# Patient Record
Sex: MALE | Race: WHITE | NOT HISPANIC OR LATINO | Employment: OTHER | URBAN - METROPOLITAN AREA
[De-identification: names, ages, dates, MRNs, and addresses within clinical notes are randomized per-mention and may not be internally consistent; named-entity substitution may affect disease eponyms.]

---

## 2019-08-11 ENCOUNTER — APPOINTMENT (OUTPATIENT)
Dept: RADIOLOGY | Facility: MEDICAL CENTER | Age: 74
DRG: 065 | End: 2019-08-11
Attending: EMERGENCY MEDICINE
Payer: MEDICARE

## 2019-08-11 ENCOUNTER — HOSPITAL ENCOUNTER (INPATIENT)
Facility: MEDICAL CENTER | Age: 74
LOS: 3 days | DRG: 065 | End: 2019-08-15
Attending: EMERGENCY MEDICINE | Admitting: INTERNAL MEDICINE
Payer: MEDICARE

## 2019-08-11 ENCOUNTER — HOSPITAL ENCOUNTER (OUTPATIENT)
Dept: RADIOLOGY | Facility: MEDICAL CENTER | Age: 74
End: 2019-08-11

## 2019-08-11 ENCOUNTER — APPOINTMENT (OUTPATIENT)
Dept: RADIOLOGY | Facility: MEDICAL CENTER | Age: 74
DRG: 065 | End: 2019-08-11
Attending: INTERNAL MEDICINE
Payer: MEDICARE

## 2019-08-11 DIAGNOSIS — R42 DIZZINESS: Primary | ICD-10-CM

## 2019-08-11 PROBLEM — I25.10 CAD (CORONARY ARTERY DISEASE): Status: ACTIVE | Noted: 2019-08-11

## 2019-08-11 PROBLEM — Z79.4 TYPE 2 DIABETES MELLITUS, WITH LONG-TERM CURRENT USE OF INSULIN (HCC): Status: ACTIVE | Noted: 2019-08-11

## 2019-08-11 PROBLEM — R27.0 ATAXIA: Status: ACTIVE | Noted: 2019-08-11

## 2019-08-11 PROBLEM — I10 HYPERTENSION: Status: ACTIVE | Noted: 2019-08-11

## 2019-08-11 PROBLEM — R79.89 ELEVATED TROPONIN: Status: ACTIVE | Noted: 2019-08-11

## 2019-08-11 PROBLEM — E11.9 TYPE 2 DIABETES MELLITUS, WITH LONG-TERM CURRENT USE OF INSULIN (HCC): Status: ACTIVE | Noted: 2019-08-11

## 2019-08-11 LAB
ABO GROUP BLD: NORMAL
ALBUMIN SERPL BCP-MCNC: 4.3 G/DL (ref 3.2–4.9)
ALBUMIN/GLOB SERPL: 1.5 G/DL
ALP SERPL-CCNC: 87 U/L (ref 30–99)
ALT SERPL-CCNC: 15 U/L (ref 2–50)
ANION GAP SERPL CALC-SCNC: 13 MMOL/L (ref 0–11.9)
APPEARANCE UR: CLEAR
APTT PPP: 26.2 SEC (ref 24.7–36)
AST SERPL-CCNC: 19 U/L (ref 12–45)
BASOPHILS # BLD AUTO: 0.2 % (ref 0–1.8)
BASOPHILS # BLD: 0.02 K/UL (ref 0–0.12)
BILIRUB SERPL-MCNC: 0.5 MG/DL (ref 0.1–1.5)
BILIRUB UR QL STRIP.AUTO: NEGATIVE
BLD GP AB SCN SERPL QL: NORMAL
BUN SERPL-MCNC: 12 MG/DL (ref 8–22)
CALCIUM SERPL-MCNC: 9.5 MG/DL (ref 8.5–10.5)
CHLORIDE SERPL-SCNC: 106 MMOL/L (ref 96–112)
CO2 SERPL-SCNC: 22 MMOL/L (ref 20–33)
COLOR UR: YELLOW
CREAT SERPL-MCNC: 0.96 MG/DL (ref 0.5–1.4)
EKG IMPRESSION: NORMAL
EOSINOPHIL # BLD AUTO: 0.01 K/UL (ref 0–0.51)
EOSINOPHIL NFR BLD: 0.1 % (ref 0–6.9)
ERYTHROCYTE [DISTWIDTH] IN BLOOD BY AUTOMATED COUNT: 47.1 FL (ref 35.9–50)
GLOBULIN SER CALC-MCNC: 2.9 G/DL (ref 1.9–3.5)
GLUCOSE BLD-MCNC: 117 MG/DL (ref 65–99)
GLUCOSE BLD-MCNC: 124 MG/DL (ref 65–99)
GLUCOSE BLD-MCNC: 151 MG/DL (ref 65–99)
GLUCOSE SERPL-MCNC: 158 MG/DL (ref 65–99)
GLUCOSE UR STRIP.AUTO-MCNC: NEGATIVE MG/DL
HCT VFR BLD AUTO: 39.9 % (ref 42–52)
HGB BLD-MCNC: 12.9 G/DL (ref 14–18)
IMM GRANULOCYTES # BLD AUTO: 0.04 K/UL (ref 0–0.11)
IMM GRANULOCYTES NFR BLD AUTO: 0.5 % (ref 0–0.9)
INR PPP: 1.02 (ref 0.87–1.13)
KETONES UR STRIP.AUTO-MCNC: >=80 MG/DL
LEUKOCYTE ESTERASE UR QL STRIP.AUTO: NEGATIVE
LYMPHOCYTES # BLD AUTO: 1.3 K/UL (ref 1–4.8)
LYMPHOCYTES NFR BLD: 16 % (ref 22–41)
MCH RBC QN AUTO: 27.4 PG (ref 27–33)
MCHC RBC AUTO-ENTMCNC: 32.3 G/DL (ref 33.7–35.3)
MCV RBC AUTO: 84.7 FL (ref 81.4–97.8)
MICRO URNS: ABNORMAL
MONOCYTES # BLD AUTO: 0.48 K/UL (ref 0–0.85)
MONOCYTES NFR BLD AUTO: 5.9 % (ref 0–13.4)
NEUTROPHILS # BLD AUTO: 6.27 K/UL (ref 1.82–7.42)
NEUTROPHILS NFR BLD: 77.3 % (ref 44–72)
NITRITE UR QL STRIP.AUTO: NEGATIVE
NRBC # BLD AUTO: 0 K/UL
NRBC BLD-RTO: 0 /100 WBC
PH UR STRIP.AUTO: 6 [PH] (ref 5–8)
PLATELET # BLD AUTO: 203 K/UL (ref 164–446)
PMV BLD AUTO: 8.7 FL (ref 9–12.9)
POTASSIUM SERPL-SCNC: 4.3 MMOL/L (ref 3.6–5.5)
PROT SERPL-MCNC: 7.2 G/DL (ref 6–8.2)
PROT UR QL STRIP: NEGATIVE MG/DL
PROTHROMBIN TIME: 13.6 SEC (ref 12–14.6)
RBC # BLD AUTO: 4.71 M/UL (ref 4.7–6.1)
RBC UR QL AUTO: NEGATIVE
RH BLD: NORMAL
SODIUM SERPL-SCNC: 141 MMOL/L (ref 135–145)
SP GR UR STRIP.AUTO: 1.01
TROPONIN T SERPL-MCNC: 42 NG/L (ref 6–19)
TROPONIN T SERPL-MCNC: 44 NG/L (ref 6–19)
TSH SERPL DL<=0.005 MIU/L-ACNC: 1.5 UIU/ML (ref 0.38–5.33)
UROBILINOGEN UR STRIP.AUTO-MCNC: 0.2 MG/DL
WBC # BLD AUTO: 8.1 K/UL (ref 4.8–10.8)

## 2019-08-11 PROCEDURE — A9270 NON-COVERED ITEM OR SERVICE: HCPCS | Performed by: EMERGENCY MEDICINE

## 2019-08-11 PROCEDURE — 700102 HCHG RX REV CODE 250 W/ 637 OVERRIDE(OP): Performed by: INTERNAL MEDICINE

## 2019-08-11 PROCEDURE — 700111 HCHG RX REV CODE 636 W/ 250 OVERRIDE (IP): Performed by: INTERNAL MEDICINE

## 2019-08-11 PROCEDURE — G0378 HOSPITAL OBSERVATION PER HR: HCPCS

## 2019-08-11 PROCEDURE — 71045 X-RAY EXAM CHEST 1 VIEW: CPT

## 2019-08-11 PROCEDURE — A9270 NON-COVERED ITEM OR SERVICE: HCPCS | Performed by: INTERNAL MEDICINE

## 2019-08-11 PROCEDURE — 99285 EMERGENCY DEPT VISIT HI MDM: CPT

## 2019-08-11 PROCEDURE — 84484 ASSAY OF TROPONIN QUANT: CPT

## 2019-08-11 PROCEDURE — 96372 THER/PROPH/DIAG INJ SC/IM: CPT

## 2019-08-11 PROCEDURE — 700105 HCHG RX REV CODE 258: Performed by: INTERNAL MEDICINE

## 2019-08-11 PROCEDURE — 36415 COLL VENOUS BLD VENIPUNCTURE: CPT

## 2019-08-11 PROCEDURE — 70498 CT ANGIOGRAPHY NECK: CPT

## 2019-08-11 PROCEDURE — 82962 GLUCOSE BLOOD TEST: CPT | Mod: 91

## 2019-08-11 PROCEDURE — 93005 ELECTROCARDIOGRAM TRACING: CPT | Performed by: EMERGENCY MEDICINE

## 2019-08-11 PROCEDURE — 86901 BLOOD TYPING SEROLOGIC RH(D): CPT

## 2019-08-11 PROCEDURE — 99220 PR INITIAL OBSERVATION CARE,LEVL III: CPT | Mod: 25 | Performed by: INTERNAL MEDICINE

## 2019-08-11 PROCEDURE — 86900 BLOOD TYPING SEROLOGIC ABO: CPT

## 2019-08-11 PROCEDURE — 700102 HCHG RX REV CODE 250 W/ 637 OVERRIDE(OP): Performed by: EMERGENCY MEDICINE

## 2019-08-11 PROCEDURE — 85025 COMPLETE CBC W/AUTO DIFF WBC: CPT

## 2019-08-11 PROCEDURE — 93010 ELECTROCARDIOGRAM REPORT: CPT | Performed by: INTERNAL MEDICINE

## 2019-08-11 PROCEDURE — 93005 ELECTROCARDIOGRAM TRACING: CPT | Performed by: INTERNAL MEDICINE

## 2019-08-11 PROCEDURE — 700117 HCHG RX CONTRAST REV CODE 255: Performed by: EMERGENCY MEDICINE

## 2019-08-11 PROCEDURE — 85730 THROMBOPLASTIN TIME PARTIAL: CPT

## 2019-08-11 PROCEDURE — 85610 PROTHROMBIN TIME: CPT

## 2019-08-11 PROCEDURE — 70496 CT ANGIOGRAPHY HEAD: CPT

## 2019-08-11 PROCEDURE — 84443 ASSAY THYROID STIM HORMONE: CPT

## 2019-08-11 PROCEDURE — 99358 PROLONG SERVICE W/O CONTACT: CPT | Performed by: INTERNAL MEDICINE

## 2019-08-11 PROCEDURE — 81003 URINALYSIS AUTO W/O SCOPE: CPT

## 2019-08-11 PROCEDURE — 86850 RBC ANTIBODY SCREEN: CPT

## 2019-08-11 PROCEDURE — 80053 COMPREHEN METABOLIC PANEL: CPT

## 2019-08-11 PROCEDURE — 70551 MRI BRAIN STEM W/O DYE: CPT

## 2019-08-11 RX ORDER — TAMSULOSIN HYDROCHLORIDE 0.4 MG/1
0.4 CAPSULE ORAL
COMMUNITY
Start: 2016-01-01

## 2019-08-11 RX ORDER — ONDANSETRON 2 MG/ML
4 INJECTION INTRAMUSCULAR; INTRAVENOUS EVERY 4 HOURS PRN
Status: DISCONTINUED | OUTPATIENT
Start: 2019-08-11 | End: 2019-08-15 | Stop reason: HOSPADM

## 2019-08-11 RX ORDER — SODIUM CHLORIDE 9 MG/ML
INJECTION, SOLUTION INTRAVENOUS CONTINUOUS
Status: DISCONTINUED | OUTPATIENT
Start: 2019-08-11 | End: 2019-08-15

## 2019-08-11 RX ORDER — MECLIZINE HYDROCHLORIDE 25 MG/1
25 TABLET ORAL 3 TIMES DAILY PRN
Status: DISCONTINUED | OUTPATIENT
Start: 2019-08-11 | End: 2019-08-15 | Stop reason: HOSPADM

## 2019-08-11 RX ORDER — ASPIRIN 325 MG
325 TABLET ORAL DAILY
Status: DISCONTINUED | OUTPATIENT
Start: 2019-08-12 | End: 2019-08-14

## 2019-08-11 RX ORDER — BISACODYL 10 MG
10 SUPPOSITORY, RECTAL RECTAL
Status: DISCONTINUED | OUTPATIENT
Start: 2019-08-11 | End: 2019-08-15 | Stop reason: HOSPADM

## 2019-08-11 RX ORDER — ONDANSETRON 4 MG/1
4 TABLET, ORALLY DISINTEGRATING ORAL EVERY 4 HOURS PRN
Status: DISCONTINUED | OUTPATIENT
Start: 2019-08-11 | End: 2019-08-15 | Stop reason: HOSPADM

## 2019-08-11 RX ORDER — LORAZEPAM 2 MG/ML
0.5 INJECTION INTRAMUSCULAR ONCE
Status: COMPLETED | OUTPATIENT
Start: 2019-08-11 | End: 2019-08-11

## 2019-08-11 RX ORDER — ASPIRIN 81 MG/1
324 TABLET, CHEWABLE ORAL DAILY
Status: DISCONTINUED | OUTPATIENT
Start: 2019-08-12 | End: 2019-08-14

## 2019-08-11 RX ORDER — CARVEDILOL 12.5 MG/1
12.5 TABLET ORAL 2 TIMES DAILY WITH MEALS
Status: ON HOLD | COMMUNITY
Start: 2015-01-01 | End: 2019-08-15

## 2019-08-11 RX ORDER — ROSUVASTATIN CALCIUM 20 MG/1
40 TABLET, COATED ORAL DAILY
Status: ON HOLD | COMMUNITY
Start: 2016-01-01 | End: 2019-08-15

## 2019-08-11 RX ORDER — VENLAFAXINE 37.5 MG/1
37.5 TABLET ORAL 2 TIMES DAILY
COMMUNITY
Start: 2017-01-01

## 2019-08-11 RX ORDER — ASPIRIN 81 MG/1
81 TABLET, CHEWABLE ORAL DAILY
COMMUNITY

## 2019-08-11 RX ORDER — AMOXICILLIN 250 MG
2 CAPSULE ORAL 2 TIMES DAILY
Status: DISCONTINUED | OUTPATIENT
Start: 2019-08-11 | End: 2019-08-15 | Stop reason: HOSPADM

## 2019-08-11 RX ORDER — POLYETHYLENE GLYCOL 3350 17 G/17G
1 POWDER, FOR SOLUTION ORAL
Status: DISCONTINUED | OUTPATIENT
Start: 2019-08-11 | End: 2019-08-15 | Stop reason: HOSPADM

## 2019-08-11 RX ORDER — ASPIRIN 300 MG/1
300 SUPPOSITORY RECTAL DAILY
Status: DISCONTINUED | OUTPATIENT
Start: 2019-08-12 | End: 2019-08-14

## 2019-08-11 RX ORDER — INSULIN GLARGINE 100 [IU]/ML
28 INJECTION, SOLUTION SUBCUTANEOUS NIGHTLY
Status: ON HOLD | COMMUNITY
End: 2019-08-15

## 2019-08-11 RX ORDER — ATORVASTATIN CALCIUM 40 MG/1
40 TABLET, FILM COATED ORAL EVERY EVENING
Status: DISCONTINUED | OUTPATIENT
Start: 2019-08-11 | End: 2019-08-12

## 2019-08-11 RX ORDER — ASPIRIN 325 MG
325 TABLET ORAL ONCE
Status: COMPLETED | OUTPATIENT
Start: 2019-08-11 | End: 2019-08-11

## 2019-08-11 RX ADMIN — SODIUM CHLORIDE: 9 INJECTION, SOLUTION INTRAVENOUS at 05:02

## 2019-08-11 RX ADMIN — SODIUM CHLORIDE: 9 INJECTION, SOLUTION INTRAVENOUS at 04:15

## 2019-08-11 RX ADMIN — ENOXAPARIN SODIUM 40 MG: 100 INJECTION SUBCUTANEOUS at 05:01

## 2019-08-11 RX ADMIN — SODIUM CHLORIDE: 9 INJECTION, SOLUTION INTRAVENOUS at 21:40

## 2019-08-11 RX ADMIN — INSULIN HUMAN 1 UNITS: 100 INJECTION, SOLUTION PARENTERAL at 18:23

## 2019-08-11 RX ADMIN — LORAZEPAM 0.5 MG: 2 INJECTION INTRAMUSCULAR; INTRAVENOUS at 13:11

## 2019-08-11 RX ADMIN — ATORVASTATIN CALCIUM 40 MG: 40 TABLET, FILM COATED ORAL at 18:15

## 2019-08-11 RX ADMIN — ASPIRIN 325 MG: 325 TABLET, FILM COATED ORAL at 03:04

## 2019-08-11 RX ADMIN — IOHEXOL 80 ML: 350 INJECTION, SOLUTION INTRAVENOUS at 02:13

## 2019-08-11 SDOH — HEALTH STABILITY: MENTAL HEALTH: HOW OFTEN DO YOU HAVE 6 OR MORE DRINKS ON ONE OCCASION?: NEVER

## 2019-08-11 SDOH — HEALTH STABILITY: MENTAL HEALTH: HOW OFTEN DO YOU HAVE A DRINK CONTAINING ALCOHOL?: NEVER

## 2019-08-11 SDOH — HEALTH STABILITY: MENTAL HEALTH: HOW MANY STANDARD DRINKS CONTAINING ALCOHOL DO YOU HAVE ON A TYPICAL DAY?: 1 OR 2

## 2019-08-11 ASSESSMENT — COGNITIVE AND FUNCTIONAL STATUS - GENERAL
DAILY ACTIVITIY SCORE: 24
SUGGESTED CMS G CODE MODIFIER DAILY ACTIVITY: CH
MOBILITY SCORE: 24
SUGGESTED CMS G CODE MODIFIER MOBILITY: CH

## 2019-08-11 ASSESSMENT — PATIENT HEALTH QUESTIONNAIRE - PHQ9
2. FEELING DOWN, DEPRESSED, IRRITABLE, OR HOPELESS: NOT AT ALL
SUM OF ALL RESPONSES TO PHQ9 QUESTIONS 1 AND 2: 0
1. LITTLE INTEREST OR PLEASURE IN DOING THINGS: NOT AT ALL
SUM OF ALL RESPONSES TO PHQ9 QUESTIONS 1 AND 2: 0
2. FEELING DOWN, DEPRESSED, IRRITABLE, OR HOPELESS: NOT AT ALL
1. LITTLE INTEREST OR PLEASURE IN DOING THINGS: NOT AT ALL

## 2019-08-11 ASSESSMENT — ENCOUNTER SYMPTOMS
CHILLS: 0
FLANK PAIN: 0
BLOOD IN STOOL: 0
HEADACHES: 0
PALPITATIONS: 0
COUGH: 0
VOMITING: 1
SEIZURES: 0
SHORTNESS OF BREATH: 0
DIAPHORESIS: 0
WHEEZING: 0
SENSORY CHANGE: 0
BACK PAIN: 0
MYALGIAS: 0
VOMITING: 0
DIZZINESS: 1
FOCAL WEAKNESS: 0
DIARRHEA: 0
WEAKNESS: 0
SPUTUM PRODUCTION: 0
NAUSEA: 0
NAUSEA: 1
SPEECH CHANGE: 0
SORE THROAT: 0
NECK PAIN: 0
BLURRED VISION: 0
FEVER: 0
ABDOMINAL PAIN: 0
TINGLING: 0
BRUISES/BLEEDS EASILY: 0

## 2019-08-11 ASSESSMENT — LIFESTYLE VARIABLES
HAVE YOU EVER FELT YOU SHOULD CUT DOWN ON YOUR DRINKING: NO
TOTAL SCORE: 0
EVER HAD A DRINK FIRST THING IN THE MORNING TO STEADY YOUR NERVES TO GET RID OF A HANGOVER: NO
EVER_SMOKED: NEVER
DO YOU DRINK ALCOHOL: NO
AVERAGE NUMBER OF DAYS PER WEEK YOU HAVE A DRINK CONTAINING ALCOHOL: 0
TOTAL SCORE: 0
ON A TYPICAL DAY WHEN YOU DRINK ALCOHOL HOW MANY DRINKS DO YOU HAVE: 0
CONSUMPTION TOTAL: NEGATIVE
HAVE PEOPLE ANNOYED YOU BY CRITICIZING YOUR DRINKING: NO
ALCOHOL_USE: NO
EVER FELT BAD OR GUILTY ABOUT YOUR DRINKING: NO
TOTAL SCORE: 0
HOW MANY TIMES IN THE PAST YEAR HAVE YOU HAD 5 OR MORE DRINKS IN A DAY: 0

## 2019-08-11 NOTE — CARE PLAN
Problem: Safety  Goal: Will remain free from falls  Outcome: PROGRESSING AS EXPECTED  Intervention: Implement fall precautions  Flowsheets (Taken 8/11/2019 0603)  Environmental Precautions: Treaded Slipper Socks on Patient; Personal Belongings, Wastebasket, Call Bell etc. in Easy Reach; Bed in Low Position; Communication Sign for Patients & Families; Mobility Assessed & Appropriate Sign Placed  IV Pole on Same Side of Bed as Bathroom: Yes

## 2019-08-11 NOTE — H&P
Hospital Medicine History & Physical Note    Date of Service  8/11/2019    Primary Care Physician  No primary care provider on file.    Consultants  None    Code Status  Full code    Chief Complaint  Ataxia and vertigo    History of Presenting Illness  74 y.o. male with a past medical history of CAD status post stent placement and CABG, MVR, hypertension, insulin-dependent diabetes mellitus, hyperlipidemia who was transferred from [facility] on 8/11/2019 with concerns of posterior circulation stroke.  Patient reports vaping some marijuana Friday morning after which he suffered a ground-level fall and hit the back of his head.  He is unsure if he lost consciousness.  He denied any headache at the time.  Yesterday afternoon he suddenly developed symptoms of nausea and vertigo.  He was unable to ambulate and keep his balance.  He had multiple episodes of vomiting.  He denies any focal weakness, vision changes, headache, chest pain or shortness of breath.  He denies any fevers or chills.  He was taken to [place] [facility] where he underwent CT head without contrast that was negative.  He was transferred to [facility] for concerns of posterior circulation stroke.    I have reviewed the medical records from the transferring facility which are summarized as follows:    EKG from outlying facility interpreted by me reveals sinus rhythm with first-degree AV block.  No ST elevation or ST depression noted    X-ray: Prior heart surgery, valvular and coronary artery.  No acute cardiopulmonary process.  ASVD changes.  Degenerative changes in the shoulders and spine.  Probable hiatal hernia.    CT head without contrast: Moderate symmetric atrophic changes.  Probable old lacunar infarct on the right lentiform nucleus.  No acute hemorrhagic process, intra-or extra-axial.  Periventricular white matter changes.    WBC 11.6, hemoglobin 13.6, hematocrit 41, platelets 270  Sodium 142, potassium 3.9, chloride 106, CO2 21, anion gap 15, glucose  131, BUN 14, creatinine 1.12, calcium 9.2, total bili 0.4, alkaline phosphatase 100, AST 20, ALT 28, total protein 7.3, albumin 3.6, globulin 3.7  Troponin less than 0.04        Review of Systems  Review of Systems   Constitutional: Negative for chills, diaphoresis and fever.   HENT: Negative for hearing loss and sore throat.    Eyes: Negative for blurred vision.   Respiratory: Negative for cough, sputum production, shortness of breath and wheezing.    Cardiovascular: Negative for chest pain, palpitations and leg swelling.   Gastrointestinal: Positive for nausea and vomiting. Negative for abdominal pain, blood in stool and diarrhea.   Genitourinary: Negative for dysuria, flank pain and urgency.   Musculoskeletal: Negative for back pain, joint pain, myalgias and neck pain.   Skin: Negative for rash.   Neurological: Positive for dizziness. Negative for focal weakness, seizures and headaches.   Endo/Heme/Allergies: Does not bruise/bleed easily.   Psychiatric/Behavioral: Negative for suicidal ideas.   All other systems reviewed and are negative.      Past Medical History  CAD, hypertension, diabetes    Surgical History  CABG    Family History  No pertinent family hist    Social History   Denies alcohol or tobacco use    Allergies  NKDA    Medications  Lantus, Coreg, aspirin, metformin, rosuvastatin, Flomax       Physical Exam  Temp:  [36.6 °C (97.8 °F)-36.7 °C (98.1 °F)] 36.7 °C (98 °F)  Pulse:  [70-72] 72  Resp:  [18-20] 18  BP: (122-133)/(64-70) 133/67  SpO2:  [95 %-98 %] 98 %    Physical Exam   Constitutional: He is oriented to person, place, and time. He appears well-developed and well-nourished. No distress.   HENT:   Head: Normocephalic and atraumatic.   Mouth/Throat: Oropharynx is clear and moist.   Eyes: Pupils are equal, round, and reactive to light. Conjunctivae are normal. No scleral icterus.   Neck: Normal range of motion. Neck supple.   Cardiovascular: Normal rate, regular rhythm and normal heart sounds.    Pulmonary/Chest: Effort normal and breath sounds normal. No respiratory distress. He has no wheezes. He has no rales.   Abdominal: Soft. Bowel sounds are normal. He exhibits no distension. There is no tenderness. There is no rebound.   Musculoskeletal: Normal range of motion. He exhibits no edema or tenderness.   Lymphadenopathy:     He has no cervical adenopathy.   Neurological: He is alert and oriented to person, place, and time. No cranial nerve deficit. Coordination normal.   Strength and sensation intact bilaterally   Skin: Skin is warm. No rash noted.   Psychiatric: He has a normal mood and affect. His behavior is normal.   Nursing note and vitals reviewed.      Laboratory:  Recent Labs     08/11/19  0146   WBC 8.1   RBC 4.71   HEMOGLOBIN 12.9*   HEMATOCRIT 39.9*   MCV 84.7   MCH 27.4   MCHC 32.3*   RDW 47.1   PLATELETCT 203   MPV 8.7*     Recent Labs     08/11/19  0146   SODIUM 141   POTASSIUM 4.3   CHLORIDE 106   CO2 22   GLUCOSE 158*   BUN 12   CREATININE 0.96   CALCIUM 9.5     Recent Labs     08/11/19  0146   ALTSGPT 15   ASTSGOT 19   ALKPHOSPHAT 87   TBILIRUBIN 0.5   GLUCOSE 158*     Recent Labs     08/11/19  0146   APTT 26.2   INR 1.02     No results for input(s): NTPROBNP in the last 72 hours.      Recent Labs     08/11/19  0146   TROPONINT 42*       Urinalysis:    Recent Labs     08/11/19  0306   SPECGRAVITY 1.010   GLUCOSEUR Negative   KETONES >=80*   NITRITE Negative   LEUKESTERAS Negative        Imaging:  DX-CHEST-PORTABLE (1 VIEW)   Final Result         Minimal left basilar atelectasis.      CT-CTA HEAD WITH & W/O-POST PROCESS   Final Result         1. No hemodynamically significant narrowing of the major intracranial vessels.      2. Patent right vertebral artery. Somewhat diminutive appearance of the V4 portion of the left vertebral artery; however, there is still flow seen. If there is concern for acute ischemia, further evaluation with MRI is recommended.      CT-CTA NECK WITH & W/O-POST  PROCESSING   Final Result         1. Patent right vertebral artery. Somewhat diminutive appearance of the V4 portion of the left vertebral artery; however, there is still flow seen.      2. No evidence of flow-limiting stenosis in the cervical carotid arteries.      OUTSIDE IMAGES-DX CHEST   Final Result      OUTSIDE IMAGES-CT HEAD   Final Result      EC-ECHOCARDIOGRAM COMPLETE W/O CONT    (Results Pending)   MR-BRAIN-W/O    (Results Pending)         Assessment/Plan:  I anticipate this patient is appropriate for observation status at this time.    Ataxia- (present on admission)  Assessment & Plan  Sudden onset of ataxia concerning for posterior circulation stroke  Patient is past the tPA window  Patient will be placed on continuous cardiac monitoring to evaluate for any arrhythmias  Q4 hours neuro checks  I will order MRI brain  Check 2-D echo  Patient will be started on full dose aspirin and high intensity statin  Allow permissive hypertension  Check TSH, lipid panel and hemoglobin A1c  PTOT and ST evaluation        CAD (coronary artery disease)  Assessment & Plan  Continue aspirin and statin    Hypertension- (present on admission)  Assessment & Plan  Allow permissive hypertension  IV labetalol and hydralazine PRN for SBP greater than 220 and DBP greater than 120    Type 2 diabetes mellitus, with long-term current use of insulin (HCC)- (present on admission)  Assessment & Plan  Uncontrolled with hyperglycemia  Start on insulin sliding scale with serial Accu-Checks  Check hemoglobin A1c  Hypoglycemic protocol in place        Elevated troponin- (present on admission)  Assessment & Plan  Denies active chest pain  Trend troponin with continuous cardiac monitoring    Vertigo- (present on admission)  Assessment & Plan  MRI brain ordered  Meclizine as needed  PT OT with vestibular rehab        VTE prophylaxis: lovenox    I spent a total of 30 minutes of non face to face time performing additional research, reviewing  medical records from transferring facility, discussing plan of care with other healthcare providers. Start time: 3 50 am. End time: 4 20 am

## 2019-08-11 NOTE — ED NOTES
Patient resting in bed, sleeping, no signs of pain or distress, unlabored breathing noted, attached to cardiac monitor, bed in low position, call light within reach. Waiting for admission/bed assignment. VSS.

## 2019-08-11 NOTE — ASSESSMENT & PLAN NOTE
S/p permissive hypertension  - resumed coreg, lower dose as he has non sustaining and asymptomatic bradycardia

## 2019-08-11 NOTE — ED NOTES
Chief Complaint   Patient presents with   • Possible Stroke     Stroke transfer from [facility].  Pt reports hitting his head at 9 am this morning on a steel bar off of his camper.  Not certain if had LOC.  He then developed vomiting, dizziness, and ataxia at 1500.  BIB family to [facility] and admitted to using marijuana vape pen for the first time today as well.  A CT of head was negative, but symptoms did not improve and pt was transfered to [facility] via flight transfer.     Upon arrival, pt is Oriented and appropriatte.  Flight crew reports he did vomit over the flight.  NS=452 at 0035.  Seen by ERP and sent to CT scan on monitor.

## 2019-08-11 NOTE — ED NOTES
Urinal placed at bedside for pt use. Pt continues resting, VSS. Waiting for results & disposition.

## 2019-08-11 NOTE — ASSESSMENT & PLAN NOTE
With hyperglycemia but A1C controlled at 6.3%. Takes metformin, januvia and lantus at home.  - sensitive sliding scale  - DM diet and hypoglycemia protocol

## 2019-08-11 NOTE — ED NOTES
Xray at bedside. Pt brought from CT to ED room red 7. Report received from Renay ENNIS, assuming care of pt at this time. Pt in NAD, VSS. Placed on continuous cardiac monitoring, 2 L NC for comfort, continuous pulse ox/BP.

## 2019-08-11 NOTE — ED PROVIDER NOTES
"ED Provider Note     8/11/2019  2:07 AM    Means of Arrival: EMS transfer via air transport  History obtained by: patient and outside facility  Limitations:     CHIEF COMPLAINT  Transfer for possible posterior stroke    HPI  Aleks Hernandez is a 74 y.o. male history of diabetes, hypertension who presents as transfer for concerns possible posterior stroke.  Symptoms started approximately 3 PM this afternoon.  He says yesterday shortly after taking hit of marijuana vape he fell backwards and hit his head on a tree stump.  He believes he may have lost consciousness for \"a second.\"  He had no headache afterwards.  No neck pain afterwards.  No dizziness afterwards.  He again tried marijuana vape this afternoon.  Shortly after his when he developed symptoms of persistent vertigo.  He says he cannot keep his balance.  Says he has thrown up multiple times.  At outside hospital he had a noncontrast CT scan which was unremarkable.  They have no MRI capabilities and was sent here for further evaluation. IR stroke evaluation initiated here.  Denies any focal weakness.  No vision changes.  No facial droop.  No difficulty speaking.  He says dizziness has improved since he arrived.    REVIEW OF SYSTEMS  Review of Systems   Constitutional: Negative for chills and fever.   HENT: Negative for congestion.    Respiratory: Negative for cough and shortness of breath.    Cardiovascular: Negative for chest pain.   Gastrointestinal: Negative for abdominal pain, nausea and vomiting.   Musculoskeletal: Negative for back pain and neck pain.   Skin: Negative for rash.   Neurological: Positive for dizziness. Negative for tingling, sensory change, speech change, focal weakness, weakness and headaches.   All other systems reviewed and are negative.    See HPI for further details.    PAST MEDICAL HISTORY       SOCIAL HISTORY  Social History     Tobacco Use   • Smoking status: Not on file   Substance and Sexual Activity   • Alcohol use: Not on file   • " "Drug use: Not on file   • Sexual activity: Not on file       SURGICAL HISTORY  patient denies any surgical history    CURRENT MEDICATIONS  Home Medications    **Home medications have not yet been reviewed for this encounter**         ALLERGIES  Allergies not on file    PHYSICAL EXAM  VITAL SIGNS: /77   Pulse 66   Temp 36.8 °C (98.2 °F) (Temporal)   Resp 18   Ht 1.75 m (5' 8.9\")   Wt 97.8 kg (215 lb 9.8 oz)   SpO2 100%   BMI 31.93 kg/m²     Pulse ox interpretation: I interpret this pulse ox as normal.  Constitutional: Alert in no apparent distress 74 year old man on stretcher with vomit on gown.  HENT: No signs of trauma, Bilateral external ears normal, Nose normal.   Eyes: Pupils are equal, Conjunctiva normal, Non-icteric. No nystagmus.  Neck: Normal range of motion, No tenderness, Supple, No stridor.   Cardiovascular: Regular rate and rhythm, no murmurs. Symmetric distal pulses. No cyanosis of extremities. No peripheral edema of extremities.  Thorax & Lungs: Normal breath sounds, No respiratory distress, No wheezing, No chest tenderness.   Abdomen: Bowel sounds normal, Soft, No tenderness, No masses, No pulsatile masses. No peritoneal signs.  Skin: Warm, Dry, No erythema, No rash.   Back: No midline bony tenderness, No CVA tenderness.   Musculoskeletal: Good range of motion in all major joints. No tenderness to palpation or major deformities noted.   Neurologic: Alert and oriented to person place time situation.  He has no facial droop.  He has clear speech.  No visual deficits.  Out of 5 strength in all extremities.  He has no limb ataxia.  No sensory deficits.  No aphasia.  NIH stroke 0.   Psychiatric: Affect normal, Judgment normal, Mood normal.   Physical Exam      DIAGNOSTIC STUDIES / PROCEDURES    EKG  Results for orders placed or performed during the hospital encounter of 08/11/19   EKG (NOW)   Result Value Ref Range    Report       [facility]    Test Date:  " 2019  Pt Name:    SANTIAGO BENNETT                  Department: ER  MRN:        [id number]                      Room:  Gender:     Male                         Technician: [id number]  :        1945                   Requested By:ESDRAS CONTRERAS II  Order #:    [id number]                    Reading MD: Esdras Contreras II, MD    Measurements  Intervals                                Axis  Rate:       67                           P:          0  AZ:         276                          QRS:        -11  QRSD:       100                          T:          4  QT:         456  QTc:        482    Interpretive Statements  SINUS RHYTHM  Rate 67  FIRST DEGREE AV BLOCK, BORDERLINE PROLONGED QT INTERVAL  NO ST elevation or depression.  T wave flattening in inferior lateral leads  First degree AV block, sinus rhythm with nonspecific EKG findings.  No previous ECG available for comparison    Electronically Signed On 2019 3:05:42  PDT by Esdras Contrersa II, MD     EKG   Result Value Ref Range    Report       [facility]    Test Date:  2019  Pt Name:    SANTIAGO BENNETT                  Department: ER  MRN:        [id number]                      Room:       [facility]  Gender:     Male                         Technician: [id number]  :        1945                   Requested By:ESDRAS CONTRERAS II  Order #:    [id number]                    Reading MD:    Measurements  Intervals                                Axis  Rate:       73                           P:          0  AZ:         203                          QRS:        -29  QRSD:       98                           T:          -20  QT:         452  QTc:        499    Interpretive Statements  SINUS RHYTHM  SINUS PAUSE/ARREST WITH ATRIAL ESCAPE  BORDERLINE LEFT AXIS DEVIATION  BORDERLINE LOW VOLTAGE IN FRONTAL LEADS  LATE PRECORDIAL R/S TRANSITION  BORDERLINE T ABNORMALITIES, ANT-LAT LEADS  BORDERLINE PROLONGED QT INTERVAL  BASELINE WANDER IN LEAD(S) V1  Compared to ECG  2019 02:35:55  Sinus pause or arrest now present  ST (T wave) tomy ation no longer present  First degree AV block no longer present  T-wave abnormality still present     EKG in four (4) hours   Result Value Ref Range    Report       [facility]    Test Date:  2019  Pt Name:    SANTIAGO BENNETT                  Department: ER  MRN:        [id number]                      Room:       [facility]  Gender:     Male                         Technician: HARSHA  :        1945                   Requested By:OLIVIA DURBIN  Order #:    [id number]                    Reading MD:    Measurements  Intervals                                Axis  Rate:       73                           P:          0  NH:         203                          QRS:        -29  QRSD:       98                           T:          -20  QT:         452  QTc:        499    Interpretive Statements  SINUS RHYTHM  SINUS PAUSE/ARREST WITH ATRIAL ESCAPE  BORDERLINE LEFT AXIS DEVIATION  BORDERLINE LOW VOLTAGE IN FRONTAL LEADS  LATE PRECORDIAL R/S TRANSITION  BORDERLINE T ABNORMALITIES, ANT-LAT LEADS  BORDERLINE PROLONGED QT INTERVAL  Compared to ECG 2019 02:35:55  Sinus pause or arrest now present  ST (T wave) deviation no longer present  First degree AV  block no longer present  T-wave abnormality still present           LABS  Pertinent Labs & Imaging studies reviewed. (See chart for details)    RADIOLOGY  Pertinent Labs & Imaging studies reviewed. (See chart for details)    COURSE & MEDICAL DECISION MAKING  Pertinent Labs & Imaging studies reviewed. (See chart for details)    2:07 AM This is an emergent evaluation of a  74 y.o. male diabetes and hypertension who presents as transfer from [place] for concerns of possible posterior stroke.  He is clearly out of the window for TPA at this time.  Onset was over 10 hours ago.  On initial exam he has no obvious deficits.  He does not have nystagmus at this time.  I evaluated him initially at EMS door and  I have ordered CTA of the head and neck.  He has already had a CT noncontrast of the head that did not show any signs of hemorrhage, obvious stroke or masses.     3:08 AM  NIH score 0.  CTA did not show disruption and flow of carotid or vertebral arteries.  Labs with no major abnormalities except a elevated troponin at 42.  This is mild to moderately elevated.  He denies having any chest pain or shortness of breath.  His EKG is not consistent with a STEMI.  Spoke with Dr. Iglesias who has kindly agreed to admit for further elevation of vertigo, possible posterior stroke, possible MI.  I did give Mr. Hernandez a 325 mg aspirin here in the emergency department.     The patient will return for worsening symptoms and is stable at the time of discharge. The patient verbalizes understanding. Guidance was provided on appropriate use of medications including driving under the influence, overdose, and side effects.         FINAL IMPRESSION    ICD-10-CM   1. Dizziness R42            This dictation was created using voice recognition software. The accuracy of the dictation is limited to the abilities of the software. I expect there may be some errors of grammar and possibly content. The nursing notes were reviewed and certain aspects of this information were incorporated into this note.    Electronically signed by: Esdras Barcenas II, 8/11/2019 2:07 AM

## 2019-08-11 NOTE — ASSESSMENT & PLAN NOTE
Sudden onset of ataxia concerning for posterior circulation stroke  Patient is past the tPA window  Patient will be placed on continuous cardiac monitoring to evaluate for any arrhythmias  Q4 hours neuro checks  I will order MRI brain  Check 2-D echo  Patient will be started on full dose aspirin and high intensity statin  Allow permissive hypertension  Check TSH, lipid panel and hemoglobin A1c  PTOT and ST evaluation

## 2019-08-11 NOTE — PROGRESS NOTES
Bedside report received. Patient up to floor with belongings. Monitor room called, patient in sinus bradycardia at a rate of 59. Patient encouraged to call for assistance, call light within reach. Bed locked and in lowest position, bed alarm on.

## 2019-08-11 NOTE — PROGRESS NOTES
2 RN skin check completed with LOLI Joshi.   Devices in place N/A.  Skin assessed under devices N/A.  Confirmed pressure ulcers found on N/A.  New potential pressure ulcers noted on N/A. Wound consult placed N/A.  The following interventions in place patient encouraged to turn frequently.    Scrape noted to left arm.   Scrape noted to left of back.  Sacrum red and blanching.   Skin otherwise intact.

## 2019-08-11 NOTE — PROGRESS NOTES
Dr. Iglesias updated that patient was disoriented to time when RN picked patient up from ED. Dr. Iglesias updated that patient passed dysphagia screening; orders received to start patient on a cardiac diabetic diet.   Ok to give patient lovenox per Dr. Iglesias.

## 2019-08-12 PROBLEM — R27.0 ATAXIA: Status: RESOLVED | Noted: 2019-08-11 | Resolved: 2019-08-12

## 2019-08-12 PROBLEM — R42 VERTIGO: Status: RESOLVED | Noted: 2019-08-11 | Resolved: 2019-08-12

## 2019-08-12 PROBLEM — I63.9 STROKE (CEREBRUM) (HCC): Status: ACTIVE | Noted: 2019-08-12

## 2019-08-12 LAB
ANION GAP SERPL CALC-SCNC: 11 MMOL/L (ref 0–11.9)
BASOPHILS # BLD AUTO: 0.4 % (ref 0–1.8)
BASOPHILS # BLD: 0.04 K/UL (ref 0–0.12)
BUN SERPL-MCNC: 13 MG/DL (ref 8–22)
CALCIUM SERPL-MCNC: 8.8 MG/DL (ref 8.5–10.5)
CHLORIDE SERPL-SCNC: 107 MMOL/L (ref 96–112)
CHOLEST SERPL-MCNC: 89 MG/DL (ref 100–199)
CO2 SERPL-SCNC: 21 MMOL/L (ref 20–33)
CREAT SERPL-MCNC: 0.9 MG/DL (ref 0.5–1.4)
EOSINOPHIL # BLD AUTO: 0.15 K/UL (ref 0–0.51)
EOSINOPHIL NFR BLD: 1.7 % (ref 0–6.9)
ERYTHROCYTE [DISTWIDTH] IN BLOOD BY AUTOMATED COUNT: 48.3 FL (ref 35.9–50)
EST. AVERAGE GLUCOSE BLD GHB EST-MCNC: 134 MG/DL
GLUCOSE BLD-MCNC: 105 MG/DL (ref 65–99)
GLUCOSE BLD-MCNC: 113 MG/DL (ref 65–99)
GLUCOSE BLD-MCNC: 124 MG/DL (ref 65–99)
GLUCOSE BLD-MCNC: 139 MG/DL (ref 65–99)
GLUCOSE BLD-MCNC: 167 MG/DL (ref 65–99)
GLUCOSE SERPL-MCNC: 110 MG/DL (ref 65–99)
HBA1C MFR BLD: 6.3 % (ref 0–5.6)
HCT VFR BLD AUTO: 37.3 % (ref 42–52)
HDLC SERPL-MCNC: 27 MG/DL
HGB BLD-MCNC: 11.6 G/DL (ref 14–18)
IMM GRANULOCYTES # BLD AUTO: 0.05 K/UL (ref 0–0.11)
IMM GRANULOCYTES NFR BLD AUTO: 0.6 % (ref 0–0.9)
LDLC SERPL CALC-MCNC: 40 MG/DL
LYMPHOCYTES # BLD AUTO: 2.23 K/UL (ref 1–4.8)
LYMPHOCYTES NFR BLD: 24.6 % (ref 22–41)
MCH RBC QN AUTO: 26.7 PG (ref 27–33)
MCHC RBC AUTO-ENTMCNC: 31.1 G/DL (ref 33.7–35.3)
MCV RBC AUTO: 85.9 FL (ref 81.4–97.8)
MONOCYTES # BLD AUTO: 0.84 K/UL (ref 0–0.85)
MONOCYTES NFR BLD AUTO: 9.3 % (ref 0–13.4)
NEUTROPHILS # BLD AUTO: 5.74 K/UL (ref 1.82–7.42)
NEUTROPHILS NFR BLD: 63.4 % (ref 44–72)
NRBC # BLD AUTO: 0 K/UL
NRBC BLD-RTO: 0 /100 WBC
PLATELET # BLD AUTO: 214 K/UL (ref 164–446)
PMV BLD AUTO: 9 FL (ref 9–12.9)
POTASSIUM SERPL-SCNC: 3.3 MMOL/L (ref 3.6–5.5)
RBC # BLD AUTO: 4.34 M/UL (ref 4.7–6.1)
SODIUM SERPL-SCNC: 139 MMOL/L (ref 135–145)
TRIGL SERPL-MCNC: 109 MG/DL (ref 0–149)
TROPONIN T SERPL-MCNC: 39 NG/L (ref 6–19)
WBC # BLD AUTO: 9.1 K/UL (ref 4.8–10.8)

## 2019-08-12 PROCEDURE — 36415 COLL VENOUS BLD VENIPUNCTURE: CPT

## 2019-08-12 PROCEDURE — 99225 PR SUBSEQUENT OBSERVATION CARE,LEVEL II: CPT | Performed by: INTERNAL MEDICINE

## 2019-08-12 PROCEDURE — 96372 THER/PROPH/DIAG INJ SC/IM: CPT

## 2019-08-12 PROCEDURE — A9270 NON-COVERED ITEM OR SERVICE: HCPCS | Performed by: INTERNAL MEDICINE

## 2019-08-12 PROCEDURE — 80061 LIPID PANEL: CPT

## 2019-08-12 PROCEDURE — 80048 BASIC METABOLIC PNL TOTAL CA: CPT

## 2019-08-12 PROCEDURE — 97161 PT EVAL LOW COMPLEX 20 MIN: CPT

## 2019-08-12 PROCEDURE — 82962 GLUCOSE BLOOD TEST: CPT | Mod: 91

## 2019-08-12 PROCEDURE — 83036 HEMOGLOBIN GLYCOSYLATED A1C: CPT

## 2019-08-12 PROCEDURE — 700105 HCHG RX REV CODE 258: Performed by: INTERNAL MEDICINE

## 2019-08-12 PROCEDURE — 700111 HCHG RX REV CODE 636 W/ 250 OVERRIDE (IP): Performed by: INTERNAL MEDICINE

## 2019-08-12 PROCEDURE — 84484 ASSAY OF TROPONIN QUANT: CPT

## 2019-08-12 PROCEDURE — 700102 HCHG RX REV CODE 250 W/ 637 OVERRIDE(OP): Performed by: INTERNAL MEDICINE

## 2019-08-12 PROCEDURE — 99221 1ST HOSP IP/OBS SF/LOW 40: CPT | Mod: GC | Performed by: PSYCHIATRY & NEUROLOGY

## 2019-08-12 PROCEDURE — 770020 HCHG ROOM/CARE - TELE (206)

## 2019-08-12 PROCEDURE — 97165 OT EVAL LOW COMPLEX 30 MIN: CPT

## 2019-08-12 PROCEDURE — 85025 COMPLETE CBC W/AUTO DIFF WBC: CPT

## 2019-08-12 RX ORDER — ACETAMINOPHEN 325 MG/1
650 TABLET ORAL EVERY 4 HOURS PRN
Status: DISCONTINUED | OUTPATIENT
Start: 2019-08-12 | End: 2019-08-15 | Stop reason: HOSPADM

## 2019-08-12 RX ORDER — CARVEDILOL 6.25 MG/1
6.25 TABLET ORAL 2 TIMES DAILY WITH MEALS
Status: DISCONTINUED | OUTPATIENT
Start: 2019-08-12 | End: 2019-08-15 | Stop reason: HOSPADM

## 2019-08-12 RX ORDER — ATORVASTATIN CALCIUM 80 MG/1
80 TABLET, FILM COATED ORAL EVERY EVENING
Status: DISCONTINUED | OUTPATIENT
Start: 2019-08-12 | End: 2019-08-15 | Stop reason: HOSPADM

## 2019-08-12 RX ORDER — POTASSIUM CHLORIDE 20 MEQ/1
40 TABLET, EXTENDED RELEASE ORAL ONCE
Status: COMPLETED | OUTPATIENT
Start: 2019-08-12 | End: 2019-08-12

## 2019-08-12 RX ORDER — CARVEDILOL 12.5 MG/1
12.5 TABLET ORAL 2 TIMES DAILY WITH MEALS
Status: DISCONTINUED | OUTPATIENT
Start: 2019-08-12 | End: 2019-08-12

## 2019-08-12 RX ORDER — TAMSULOSIN HYDROCHLORIDE 0.4 MG/1
0.4 CAPSULE ORAL
Status: DISCONTINUED | OUTPATIENT
Start: 2019-08-12 | End: 2019-08-15 | Stop reason: HOSPADM

## 2019-08-12 RX ADMIN — ASPIRIN 325 MG: 325 TABLET, FILM COATED ORAL at 05:16

## 2019-08-12 RX ADMIN — CARVEDILOL 6.25 MG: 6.25 TABLET, FILM COATED ORAL at 17:51

## 2019-08-12 RX ADMIN — POTASSIUM CHLORIDE 40 MEQ: 20 TABLET, EXTENDED RELEASE ORAL at 13:01

## 2019-08-12 RX ADMIN — INSULIN HUMAN 1 UNITS: 100 INJECTION, SOLUTION PARENTERAL at 08:52

## 2019-08-12 RX ADMIN — ATORVASTATIN CALCIUM 80 MG: 80 TABLET, FILM COATED ORAL at 17:51

## 2019-08-12 RX ADMIN — ENOXAPARIN SODIUM 40 MG: 100 INJECTION SUBCUTANEOUS at 05:16

## 2019-08-12 RX ADMIN — TAMSULOSIN HYDROCHLORIDE 0.4 MG: 0.4 CAPSULE ORAL at 13:01

## 2019-08-12 RX ADMIN — SODIUM CHLORIDE: 9 INJECTION, SOLUTION INTRAVENOUS at 13:09

## 2019-08-12 ASSESSMENT — COGNITIVE AND FUNCTIONAL STATUS - GENERAL
DAILY ACTIVITIY SCORE: 24
MOBILITY SCORE: 24
SUGGESTED CMS G CODE MODIFIER MOBILITY: CH
SUGGESTED CMS G CODE MODIFIER DAILY ACTIVITY: CH

## 2019-08-12 ASSESSMENT — ACTIVITIES OF DAILY LIVING (ADL): TOILETING: INDEPENDENT

## 2019-08-12 NOTE — PROGRESS NOTES
Hospital Medicine Daily Progress Note    Date of Service  8/12/2019    Chief Complaint  74 y.o. male admitted 8/11/2019 with acute ataxia, transferred from outside hospital to r/o stroke.    Hospital Course    feels much better today with improved ataxia, able to walk to BR with supervision; denies focal weakness/numbness/dysphagia.  Denies previous strokes.  MRI brain confirmed R inferior cerebellum infarct and tiny acute infarct L centrum semiovale.      Interval Problem Update  As above    Consultants/Specialty  neurology    Code Status  full    Disposition  Home vs SNF (patient from [facility])    Review of Systems  ROS     Physical Exam  Temp:  [36.3 °C (97.4 °F)-37.1 °C (98.7 °F)] 36.4 °C (97.6 °F)  Pulse:  [60-72] 60  Resp:  [16-18] 18  BP: (118-137)/(61-80) 137/71  SpO2:  [94 %-97 %] 96 %    Physical Exam   Constitutional: He is oriented to person, place, and time. No distress.   HENT:   Head: Normocephalic.   Eyes: EOM are normal.   Neck: Neck supple.   Cardiovascular: Normal rate and regular rhythm.   No murmur heard.  Pulmonary/Chest: Effort normal and breath sounds normal.   Abdominal: Soft. Bowel sounds are normal. He exhibits no distension. There is no tenderness.   Musculoskeletal: He exhibits no edema.   Neurological: He is alert and oriented to person, place, and time. No cranial nerve deficit. He exhibits normal muscle tone.   nonfocal exam, no dysmetria; gait not tested   Skin: Skin is warm.   Psychiatric: His behavior is normal.       Fluids    Intake/Output Summary (Last 24 hours) at 8/12/2019 1110  Last data filed at 8/12/2019 0500  Gross per 24 hour   Intake --   Output 200 ml   Net -200 ml       Laboratory  Recent Labs     08/11/19 0146 08/12/19  0016   WBC 8.1 9.1   RBC 4.71 4.34*   HEMOGLOBIN 12.9* 11.6*   HEMATOCRIT 39.9* 37.3*   MCV 84.7 85.9   MCH 27.4 26.7*   MCHC 32.3* 31.1*   RDW 47.1 48.3   PLATELETCT 203 214   MPV 8.7* 9.0     Recent Labs     08/11/19  0146 08/12/19  0016   SODIUM  141 139   POTASSIUM 4.3 3.3*   CHLORIDE 106 107   CO2 22 21   GLUCOSE 158* 110*   BUN 12 13   CREATININE 0.96 0.90   CALCIUM 9.5 8.8     Recent Labs     08/11/19  0146   APTT 26.2   INR 1.02         Recent Labs     08/12/19  0016   TRIGLYCERIDE 109   HDL 27*   LDL 40       Imaging  MR-BRAIN-W/O   Final Result      1.  Moderate sized acute infarct in the right inferior cerebellum in the distribution of right posterior inferior cerebellar artery.   2.  Tiny area of acute infarct in the left centrum semiovale.   3.  Moderate chronic microvascular ischemic disease.   4.  Moderate cerebral atrophy.      DX-CHEST-PORTABLE (1 VIEW)   Final Result         Minimal left basilar atelectasis.      CT-CTA HEAD WITH & W/O-POST PROCESS   Final Result         1. No hemodynamically significant narrowing of the major intracranial vessels.      2. Patent right vertebral artery. Somewhat diminutive appearance of the V4 portion of the left vertebral artery; however, there is still flow seen. If there is concern for acute ischemia, further evaluation with MRI is recommended.      CT-CTA NECK WITH & W/O-POST PROCESSING   Final Result         1. Patent right vertebral artery. Somewhat diminutive appearance of the V4 portion of the left vertebral artery; however, there is still flow seen.      2. No evidence of flow-limiting stenosis in the cervical carotid arteries.      OUTSIDE IMAGES-DX CHEST   Final Result      OUTSIDE IMAGES-CT HEAD   Final Result      EC-ECHOCARDIOGRAM COMPLETE W/O CONT    (Results Pending)        Assessment/Plan  Stroke (cerebrum) ([facility])  Assessment & Plan  Acute strokes, onset on 8/10 with acute ataxia/visual disturbance.  MRI showed moderate sized R inferior cerebellum stroke and tiny acute infarct L centrum ovale.    Check echo/carotid US.  On ASA/statin/permissive HTN; neurochecks;  Clinically improving with only mild ataxia now, able to walk to BR with supervision, no focal weakness/numbness.  PT/OT eval.  Neuro  consulted, Dr. Benitez will see.    CAD (coronary artery disease)- (present on admission)  Assessment & Plan  S/p CABG April 2019; stable without chest pain/sob.  Continue aspirin and statin    Hypertension- (present on admission)  Assessment & Plan  Allow permissive hypertension  IV labetalol and hydralazine PRN for SBP greater than 220 and DBP greater than 120    Type 2 diabetes mellitus, with long-term current use of insulin (HCC)- (present on admission)  Assessment & Plan  Uncontrolled with hyperglycemia  Start on insulin sliding scale with serial Accu-Checks  Check hemoglobin A1c  Hypoglycemic protocol in place        Elevated troponin- (present on admission)  Assessment & Plan  Denies active chest pain  Related to acute stroke vs demand ischemia; trops not rising.         VTE prophylaxis: lovenox

## 2019-08-12 NOTE — THERAPY
"Physical Therapy Evaluation completed.   Bed Mobility:  Supine to Sit: (Pt was upright in chair at start of session)  Transfers: Sit to Stand: Supervised  Gait:   with No Equipment Needed       Plan of Care: Patient with no further skilled PT needs in the acute care setting at this time  Discharge Recommendations: Equipment: Front-Wheel Walker. Post-acute therapy Discharge to home with outpatient for additional skilled therapy services.    See \"Rehab Therapy-Acute\" Patient Summary Report for complete documentation.     Pt is a 74 y.o. male admitted to the hospital for acute ataxia. Pt demonstrates excellent functional mobility, activity tolerance, and ambulation skills. Pt demonstrates the necessary abilities to climb stairs at home. Pt requested a walker to take home until he feels is at 100%. Pt reports feeling very close to baseline at this time. Pt would not benefit from further skilled PT in the acute care setting at this time. Anticipate d/c home with outpatient therapy to work on high-level balance.   "

## 2019-08-12 NOTE — ASSESSMENT & PLAN NOTE
Acute stroke, onset on 8/10 with acute ataxia/visual disturbance.  MRI showed moderate sized R inferior cerebellum stroke and tiny acute infarct L centrum ovale. Carotid US with < 50% stenosis.   - echo read pending  - Continue ASA/statin, neurochecks  - outside of permissive HTN, goal < 140/90  - PT/OT eval recommended outpatient PT, will give referral to patient  - Neuro following, appreciate recs  - per neuro recs, continue to observe until out of 5 day window for risk of edema
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
## 2019-08-12 NOTE — PROGRESS NOTES
Received report from day shift RN, assumed pt care. A&O x 4. No report of pain. Call light and bedside table within reach. Assessment completed. Will continue to monitor.

## 2019-08-12 NOTE — THERAPY
"Occupational Therapy Evaluation completed.   Functional Status:  Supervision supine to sit.  Pt donned socks seated EOB supervised.  Pt stood and had 1 big LOB but was able to regain balance.  Pt walked hallway without AD.  Pt returned to room and sat up in chair.  Plan of Care: Patient with no further skilled OT needs in the acute care setting at this time  Discharge Recommendations:  Equipment: Shower Chair. .Anticipate that the patient will have no further occupational therapy needs after discharge from the hospital.     Pt is 75 y/o M seen for OT evaluation.  Pt admitted with acute R cerebellar CVA.  Pt's initial symptoms included vertigo and ataxia but pt reports symptoms have nearly resolved.  Pt educated on shower AE and home safety.  Pt demonstrated no deficits in UE strength, ROM, or coordination.  Pt demonstrated mild nystagmus.  Pt appears able to complete BADL's in this setting at this time.  Pt with no further acute OT needs.     See \"Rehab Therapy-Acute\" Patient Summary Report for complete documentation.    "

## 2019-08-12 NOTE — CARE PLAN
Problem: Communication  Goal: The ability to communicate needs accurately and effectively will improve  Outcome: PROGRESSING AS EXPECTED  Note:   Pt's white board is updated. Pt has been updated on POC. All questions have been answered at this time.      Problem: Safety  Goal: Will remain free from injury  Outcome: PROGRESSING AS EXPECTED  Note:   Bed locked in lowest position. Bed alarm on. Treaded socks in use. Call light and belongings within reach. Patient educated to call for assistance. Pt verbalized understanding. Hourly rounding in place.

## 2019-08-12 NOTE — PROGRESS NOTES
Monitor Summary:    SR [id number]     2 sec pause, 1st degree, F PVCs, O PVCs, R PVCs, O PACs    .36/.08/.44

## 2019-08-13 ENCOUNTER — APPOINTMENT (OUTPATIENT)
Dept: RADIOLOGY | Facility: MEDICAL CENTER | Age: 74
DRG: 065 | End: 2019-08-13
Attending: INTERNAL MEDICINE
Payer: MEDICARE

## 2019-08-13 LAB
ANION GAP SERPL CALC-SCNC: 9 MMOL/L (ref 0–11.9)
BASOPHILS # BLD AUTO: 0.5 % (ref 0–1.8)
BASOPHILS # BLD: 0.04 K/UL (ref 0–0.12)
BUN SERPL-MCNC: 14 MG/DL (ref 8–22)
CALCIUM SERPL-MCNC: 9.1 MG/DL (ref 8.5–10.5)
CHLORIDE SERPL-SCNC: 109 MMOL/L (ref 96–112)
CO2 SERPL-SCNC: 21 MMOL/L (ref 20–33)
CREAT SERPL-MCNC: 0.93 MG/DL (ref 0.5–1.4)
EOSINOPHIL # BLD AUTO: 0.18 K/UL (ref 0–0.51)
EOSINOPHIL NFR BLD: 2.1 % (ref 0–6.9)
ERYTHROCYTE [DISTWIDTH] IN BLOOD BY AUTOMATED COUNT: 47.8 FL (ref 35.9–50)
GLUCOSE BLD-MCNC: 122 MG/DL (ref 65–99)
GLUCOSE BLD-MCNC: 131 MG/DL (ref 65–99)
GLUCOSE SERPL-MCNC: 132 MG/DL (ref 65–99)
HCT VFR BLD AUTO: 36.6 % (ref 42–52)
HGB BLD-MCNC: 11.9 G/DL (ref 14–18)
IMM GRANULOCYTES # BLD AUTO: 0.04 K/UL (ref 0–0.11)
IMM GRANULOCYTES NFR BLD AUTO: 0.5 % (ref 0–0.9)
LYMPHOCYTES # BLD AUTO: 2.05 K/UL (ref 1–4.8)
LYMPHOCYTES NFR BLD: 23.5 % (ref 22–41)
MAGNESIUM SERPL-MCNC: 1.7 MG/DL (ref 1.5–2.5)
MCH RBC QN AUTO: 27.8 PG (ref 27–33)
MCHC RBC AUTO-ENTMCNC: 32.5 G/DL (ref 33.7–35.3)
MCV RBC AUTO: 85.5 FL (ref 81.4–97.8)
MONOCYTES # BLD AUTO: 0.84 K/UL (ref 0–0.85)
MONOCYTES NFR BLD AUTO: 9.6 % (ref 0–13.4)
NEUTROPHILS # BLD AUTO: 5.57 K/UL (ref 1.82–7.42)
NEUTROPHILS NFR BLD: 63.8 % (ref 44–72)
NRBC # BLD AUTO: 0 K/UL
NRBC BLD-RTO: 0 /100 WBC
PLATELET # BLD AUTO: 205 K/UL (ref 164–446)
PMV BLD AUTO: 9 FL (ref 9–12.9)
POTASSIUM SERPL-SCNC: 3.6 MMOL/L (ref 3.6–5.5)
RBC # BLD AUTO: 4.28 M/UL (ref 4.7–6.1)
SODIUM SERPL-SCNC: 139 MMOL/L (ref 135–145)
WBC # BLD AUTO: 8.7 K/UL (ref 4.8–10.8)

## 2019-08-13 PROCEDURE — 93880 EXTRACRANIAL BILAT STUDY: CPT

## 2019-08-13 PROCEDURE — 36415 COLL VENOUS BLD VENIPUNCTURE: CPT

## 2019-08-13 PROCEDURE — 83735 ASSAY OF MAGNESIUM: CPT

## 2019-08-13 PROCEDURE — 96372 THER/PROPH/DIAG INJ SC/IM: CPT

## 2019-08-13 PROCEDURE — A9270 NON-COVERED ITEM OR SERVICE: HCPCS | Performed by: INTERNAL MEDICINE

## 2019-08-13 PROCEDURE — 700105 HCHG RX REV CODE 258: Performed by: INTERNAL MEDICINE

## 2019-08-13 PROCEDURE — A9270 NON-COVERED ITEM OR SERVICE: HCPCS | Performed by: HOSPITALIST

## 2019-08-13 PROCEDURE — 82962 GLUCOSE BLOOD TEST: CPT | Mod: 91

## 2019-08-13 PROCEDURE — 85025 COMPLETE CBC W/AUTO DIFF WBC: CPT

## 2019-08-13 PROCEDURE — 700111 HCHG RX REV CODE 636 W/ 250 OVERRIDE (IP): Performed by: INTERNAL MEDICINE

## 2019-08-13 PROCEDURE — 99232 SBSQ HOSP IP/OBS MODERATE 35: CPT | Performed by: HOSPITALIST

## 2019-08-13 PROCEDURE — 770020 HCHG ROOM/CARE - TELE (206)

## 2019-08-13 PROCEDURE — 700102 HCHG RX REV CODE 250 W/ 637 OVERRIDE(OP): Performed by: INTERNAL MEDICINE

## 2019-08-13 PROCEDURE — 700102 HCHG RX REV CODE 250 W/ 637 OVERRIDE(OP): Performed by: HOSPITALIST

## 2019-08-13 PROCEDURE — 80048 BASIC METABOLIC PNL TOTAL CA: CPT

## 2019-08-13 PROCEDURE — 93880 EXTRACRANIAL BILAT STUDY: CPT | Mod: 26 | Performed by: INTERNAL MEDICINE

## 2019-08-13 RX ORDER — TRAZODONE HYDROCHLORIDE 50 MG/1
50 TABLET ORAL NIGHTLY PRN
Status: DISCONTINUED | OUTPATIENT
Start: 2019-08-13 | End: 2019-08-15 | Stop reason: HOSPADM

## 2019-08-13 RX ADMIN — ACETAMINOPHEN 650 MG: 325 TABLET, FILM COATED ORAL at 00:35

## 2019-08-13 RX ADMIN — ATORVASTATIN CALCIUM 80 MG: 80 TABLET, FILM COATED ORAL at 18:34

## 2019-08-13 RX ADMIN — CARVEDILOL 6.25 MG: 6.25 TABLET, FILM COATED ORAL at 05:38

## 2019-08-13 RX ADMIN — SODIUM CHLORIDE: 9 INJECTION, SOLUTION INTRAVENOUS at 02:43

## 2019-08-13 RX ADMIN — TAMSULOSIN HYDROCHLORIDE 0.4 MG: 0.4 CAPSULE ORAL at 09:25

## 2019-08-13 RX ADMIN — ENOXAPARIN SODIUM 40 MG: 100 INJECTION SUBCUTANEOUS at 05:39

## 2019-08-13 RX ADMIN — TRAZODONE HYDROCHLORIDE 50 MG: 50 TABLET ORAL at 21:13

## 2019-08-13 RX ADMIN — SODIUM CHLORIDE: 9 INJECTION, SOLUTION INTRAVENOUS at 21:01

## 2019-08-13 RX ADMIN — CARVEDILOL 6.25 MG: 6.25 TABLET, FILM COATED ORAL at 18:33

## 2019-08-13 RX ADMIN — ASPIRIN 325 MG: 325 TABLET, FILM COATED ORAL at 05:38

## 2019-08-13 ASSESSMENT — ENCOUNTER SYMPTOMS
HEADACHES: 0
ABDOMINAL PAIN: 0
VOMITING: 0
PSYCHIATRIC NEGATIVE: 1
BLOOD IN STOOL: 0
LOSS OF CONSCIOUSNESS: 0
FALLS: 0
NAUSEA: 0
SHORTNESS OF BREATH: 0
FLANK PAIN: 0
SPEECH CHANGE: 0
FEVER: 0
FOCAL WEAKNESS: 0
PALPITATIONS: 0
SENSORY CHANGE: 0
MYALGIAS: 0
CHILLS: 0

## 2019-08-13 NOTE — CARE PLAN
Problem: Venous Thromboembolism (VTW)/Deep Vein Thrombosis (DVT) Prevention:  Goal: Patient will participate in Venous Thrombosis (VTE)/Deep Vein Thrombosis (DVT)Prevention Measures  Outcome: PROGRESSING AS EXPECTED  Patient ambulating.    Problem: Bowel/Gastric:  Goal: Normal bowel function is maintained or improved  Outcome: PROGRESSING AS EXPECTED   Patient having bowel movements.

## 2019-08-13 NOTE — DISCHARGE PLANNING
"Anticipated Discharge Disposition: home    Action: RN CM met with patient to further discuss travel home. Patient has researched flights to [place], starting at $300 but per patient he also has been looking into other options and \"putting feelers out\". He is waiting to hear back about a few things.     Patient still pending results of US-carotid and echo. Per MD, patient may be ready for discharge on 8/15 pending studies.    Barriers to Discharge: US-carotid/echo/medical clearance    Plan: Discharge planning team can be contacted with any needs [phone]    "

## 2019-08-13 NOTE — PROGRESS NOTES
Bedside report received from night RN, patient care assumed. A&Ox4. Patient wanting to sleep at this time. Bed in lowest, locked position, call light and belongings within reach, bed alarm and treaded socks on.

## 2019-08-13 NOTE — PROGRESS NOTES
Received report from day shift RN, assumed pt care. A&O x 4. No report of pain. Fall precautions in place. Call light and bedside table within reach. Assessment completed. Will continue to monitor.

## 2019-08-13 NOTE — PROGRESS NOTES
Hospital Medicine Daily Progress Note    Date of Service  8/13/2019    Chief Complaint  74 y.o. male admitted 8/11/2019 with acute ataxia, transferred from outside hospital to r/o stroke.    Hospital Course    feels much better today with improved ataxia, able to walk to BR with supervision; denies focal weakness/numbness/dysphagia.  Denies previous strokes.  MRI brain confirmed R inferior cerebellum infarct and tiny acute infarct L centrum semiovale.      Interval Problem Update  Getting carotid US. Family at the bedside. No overnight events.     Consultants/Specialty  neurology    Code Status  full    Disposition  Home vs SNF (patient from [facility]). Anticipate in the next 2-3 days.    Review of Systems  Review of Systems   Constitutional: Positive for malaise/fatigue. Negative for chills and fever.   Respiratory: Negative for shortness of breath.    Cardiovascular: Negative for chest pain, palpitations and leg swelling.   Gastrointestinal: Negative for abdominal pain, blood in stool, nausea and vomiting.   Genitourinary: Negative for dysuria, flank pain and hematuria.   Musculoskeletal: Negative for falls and myalgias.   Neurological: Negative for sensory change, speech change, focal weakness, loss of consciousness and headaches.   Psychiatric/Behavioral: Negative.    All other systems reviewed and are negative.       Physical Exam  Temp:  [36.4 °C (97.6 °F)-36.7 °C (98.1 °F)] 36.4 °C (97.6 °F)  Pulse:  [59-64] 59  Resp:  [18] 18  BP: (105-157)/(57-81) 105/57  SpO2:  [95 %-97 %] 95 %    Physical Exam   Constitutional: He is oriented to person, place, and time. He appears well-developed. No distress.   HENT:   Head: Normocephalic.   Mouth/Throat: Oropharynx is clear and moist.   Eyes: EOM are normal.   Neck: Normal range of motion. No tracheal deviation present.   Cardiovascular: Normal rate, regular rhythm and intact distal pulses.   No murmur heard.  Pulmonary/Chest: Effort normal and breath sounds normal. No  stridor. No respiratory distress. He has no rales.   Abdominal: Soft. He exhibits no distension. There is no tenderness.   Musculoskeletal: He exhibits no edema or tenderness.   Neurological: He is alert and oriented to person, place, and time. He displays no tremor. No cranial nerve deficit or sensory deficit. He exhibits normal muscle tone.   nonfocal exam   Skin: Skin is warm. He is not diaphoretic.   Psychiatric: His behavior is normal.   Vitals reviewed.      Fluids    Intake/Output Summary (Last 24 hours) at 8/13/2019 0835  Last data filed at 8/13/2019 0530  Gross per 24 hour   Intake --   Output 700 ml   Net -700 ml       Laboratory  Recent Labs     08/11/19  0146 08/12/19  0016 08/13/19  0028   WBC 8.1 9.1 8.7   RBC 4.71 4.34* 4.28*   HEMOGLOBIN 12.9* 11.6* 11.9*   HEMATOCRIT 39.9* 37.3* 36.6*   MCV 84.7 85.9 85.5   MCH 27.4 26.7* 27.8   MCHC 32.3* 31.1* 32.5*   RDW 47.1 48.3 47.8   PLATELETCT 203 214 205   MPV 8.7* 9.0 9.0     Recent Labs     08/11/19  0146 08/12/19  0016 08/13/19  0028   SODIUM 141 139 139   POTASSIUM 4.3 3.3* 3.6   CHLORIDE 106 107 109   CO2 22 21 21   GLUCOSE 158* 110* 132*   BUN 12 13 14   CREATININE 0.96 0.90 0.93   CALCIUM 9.5 8.8 9.1     Recent Labs     08/11/19 0146   APTT 26.2   INR 1.02         Recent Labs     08/12/19  0016   TRIGLYCERIDE 109   HDL 27*   LDL 40       Imaging  MR-BRAIN-W/O   Final Result      1.  Moderate sized acute infarct in the right inferior cerebellum in the distribution of right posterior inferior cerebellar artery.   2.  Tiny area of acute infarct in the left centrum semiovale.   3.  Moderate chronic microvascular ischemic disease.   4.  Moderate cerebral atrophy.      DX-CHEST-PORTABLE (1 VIEW)   Final Result         Minimal left basilar atelectasis.      CT-CTA HEAD WITH & W/O-POST PROCESS   Final Result         1. No hemodynamically significant narrowing of the major intracranial vessels.      2. Patent right vertebral artery. Somewhat diminutive  appearance of the V4 portion of the left vertebral artery; however, there is still flow seen. If there is concern for acute ischemia, further evaluation with MRI is recommended.      CT-CTA NECK WITH & W/O-POST PROCESSING   Final Result         1. Patent right vertebral artery. Somewhat diminutive appearance of the V4 portion of the left vertebral artery; however, there is still flow seen.      2. No evidence of flow-limiting stenosis in the cervical carotid arteries.      OUTSIDE IMAGES-DX CHEST   Final Result      OUTSIDE IMAGES-CT HEAD   Final Result      EC-ECHOCARDIOGRAM COMPLETE W/O CONT    (Results Pending)   US-CAROTID DOPPLER BILAT    (Results Pending)        Assessment/Plan  * Stroke (cerebrum) (HCC)  Assessment & Plan  Acute stroke, onset on 8/10 with acute ataxia/visual disturbance.  MRI showed moderate sized R inferior cerebellum stroke and tiny acute infarct L centrum ovale.    - echo and carotid US pending  - Continue ASA/statin, neurochecks  - outside of permissive HTN  - PT/OT eval.  - Neuro following, appreciate recs  - per neuro recs, continue to observe until out of 5 day window for risk of edema    Hypertension- (present on admission)  Assessment & Plan  S/p permissive hypertension  - resumed coreg, lower dose    Type 2 diabetes mellitus, with long-term current use of insulin (HCC)- (present on admission)  Assessment & Plan  With hyperglycemia but A1C controlled at 6.3%  - sensitive sliding scale  - DM diet and hypoglycemia protocol    CAD (coronary artery disease)- (present on admission)  Assessment & Plan  S/p CABG April 2019; stable without chest pain/sob.    -Continue aspirin and statin    Elevated troponin- (present on admission)  Assessment & Plan  Denies active chest pain  Related to acute stroke vs demand ischemia; trops not rising.       VTE prophylaxis: lovenox

## 2019-08-13 NOTE — DISCHARGE PLANNING
Care Transition Team Assessment     RN CM met with patient at bedside. Patient lives near [place] and was vacationing in his RV with family to [place]. Patient taken to [facility] for higher level and family has had to start driving back to [place] in his RV as school is starting tomorrow.    Patient independent with all ADL's prior to admit. He has an advance directive in his RV naming one of his daughters as DPOA but unsure of which one.  Patient requesting assistance with finding best transport option back home, RN CM to assist.    Information Source  Orientation : Oriented x 4  Information Given By: Patient  Informant's Name: Aleks Hernandez  Who is responsible for making decisions for patient? : Patient    Readmission Evaluation  Is this a readmission?: No    Elopement Risk  Legal Hold: No  Ambulatory or Self Mobile in Wheelchair: Yes  Disoriented: No  Psychiatric Symptoms: None  History of Wandering: No  Elopement this Admit: No  Vocalizing Wanting to Leave: No  Displays Behaviors, Body Language Wanting to Leave: No-Not at Risk for Elopement  Elopement Risk: Not at Risk for Elopement    Interdisciplinary Discharge Planning  Does Admitting Nurse Feel This Could be a Complex Discharge?: No  Lives with - Patient's Self Care Capacity: Alone and Able to Care For Self  Patient or legal guardian wants to designate a caregiver (see row info): No  Support Systems: Family Member(s), Children  Housing / Facility: [address]  Do You Take your Prescribed Medications Regularly: Yes  Able to Return to Previous ADL's: Yes  Mobility Issues: No  Prior Services: Home-Independent  Patient Expects to be Discharged to:: home  Assistance Needed: Yes  Durable Medical Equipment: Not Applicable    Discharge Preparedness  What is your plan after discharge?: Home with help  What are your discharge supports?: Child  Prior Functional Level: Ambulatory, Independent with Activities of Daily Living, Independent with Medication  Management  Difficulity with ADLs: None  Difficulity with IADLs: None    Functional Assesment  Prior Functional Level: Ambulatory, Independent with Activities of Daily Living, Independent with Medication Management    Finances  Financial Barriers to Discharge: No  Prescription Coverage: Yes    Vision / Hearing Impairment  Vision Impairment : Yes  Right Eye Vision: Impaired, Wears Glasses  Left Eye Vision: Impaired, Wears Glasses  Hearing Impairment : Yes  Hearing Impairment: Left Ear, Patient Declines to Wear Hearing Device(s), Other (Comments)(30% hearing in left ear)  Does Pt Need Special Equipment for the Hearing Impaired?: No     Advance Directive  Advance Directive?: (at home)    Domestic Abuse  Have you ever been the victim of abuse or violence?: No  Physical Abuse or Sexual Abuse: No  Verbal Abuse or Emotional Abuse: No  Possible Abuse Reported to:: Not Applicable    Psychological Assessment  History of Substance Abuse: None  History of Psychiatric Problems: No  Non-compliant with Treatment: No    Discharge Risks or Barriers  Discharge risks or barriers?: No    Anticipated Discharge Information  Anticipated discharge disposition: Home  Discharge Address: [address] [place] [address] [place] [address]  Discharge Contact Phone Number: [phone]

## 2019-08-14 ENCOUNTER — APPOINTMENT (OUTPATIENT)
Dept: CARDIOLOGY | Facility: MEDICAL CENTER | Age: 74
DRG: 065 | End: 2019-08-14
Attending: INTERNAL MEDICINE
Payer: MEDICARE

## 2019-08-14 PROBLEM — E87.6 HYPOKALEMIA: Status: ACTIVE | Noted: 2019-08-14

## 2019-08-14 LAB
ANION GAP SERPL CALC-SCNC: 9 MMOL/L (ref 0–11.9)
BUN SERPL-MCNC: 11 MG/DL (ref 8–22)
CALCIUM SERPL-MCNC: 8.9 MG/DL (ref 8.5–10.5)
CHLORIDE SERPL-SCNC: 108 MMOL/L (ref 96–112)
CO2 SERPL-SCNC: 22 MMOL/L (ref 20–33)
CREAT SERPL-MCNC: 0.88 MG/DL (ref 0.5–1.4)
ERYTHROCYTE [DISTWIDTH] IN BLOOD BY AUTOMATED COUNT: 48.3 FL (ref 35.9–50)
GLUCOSE BLD-MCNC: 120 MG/DL (ref 65–99)
GLUCOSE BLD-MCNC: 122 MG/DL (ref 65–99)
GLUCOSE BLD-MCNC: 133 MG/DL (ref 65–99)
GLUCOSE BLD-MCNC: 134 MG/DL (ref 65–99)
GLUCOSE BLD-MCNC: 135 MG/DL (ref 65–99)
GLUCOSE SERPL-MCNC: 152 MG/DL (ref 65–99)
HCT VFR BLD AUTO: 37.9 % (ref 42–52)
HGB BLD-MCNC: 12 G/DL (ref 14–18)
LV EJECT FRACT  99904: 60
LV EJECT FRACT MOD 2C 99903: 55.81
LV EJECT FRACT MOD 4C 99902: 62.75
LV EJECT FRACT MOD BP 99901: 59.7
MAGNESIUM SERPL-MCNC: 1.7 MG/DL (ref 1.5–2.5)
MCH RBC QN AUTO: 27.3 PG (ref 27–33)
MCHC RBC AUTO-ENTMCNC: 31.7 G/DL (ref 33.7–35.3)
MCV RBC AUTO: 86.1 FL (ref 81.4–97.8)
PLATELET # BLD AUTO: 198 K/UL (ref 164–446)
PMV BLD AUTO: 9 FL (ref 9–12.9)
POTASSIUM SERPL-SCNC: 3.5 MMOL/L (ref 3.6–5.5)
RBC # BLD AUTO: 4.4 M/UL (ref 4.7–6.1)
SODIUM SERPL-SCNC: 139 MMOL/L (ref 135–145)
WBC # BLD AUTO: 7.3 K/UL (ref 4.8–10.8)

## 2019-08-14 PROCEDURE — 80048 BASIC METABOLIC PNL TOTAL CA: CPT

## 2019-08-14 PROCEDURE — 93306 TTE W/DOPPLER COMPLETE: CPT

## 2019-08-14 PROCEDURE — 99232 SBSQ HOSP IP/OBS MODERATE 35: CPT | Performed by: HOSPITALIST

## 2019-08-14 PROCEDURE — 700111 HCHG RX REV CODE 636 W/ 250 OVERRIDE (IP): Performed by: INTERNAL MEDICINE

## 2019-08-14 PROCEDURE — 83735 ASSAY OF MAGNESIUM: CPT

## 2019-08-14 PROCEDURE — 700102 HCHG RX REV CODE 250 W/ 637 OVERRIDE(OP): Performed by: INTERNAL MEDICINE

## 2019-08-14 PROCEDURE — A9270 NON-COVERED ITEM OR SERVICE: HCPCS | Performed by: INTERNAL MEDICINE

## 2019-08-14 PROCEDURE — 700111 HCHG RX REV CODE 636 W/ 250 OVERRIDE (IP): Performed by: HOSPITALIST

## 2019-08-14 PROCEDURE — 82962 GLUCOSE BLOOD TEST: CPT

## 2019-08-14 PROCEDURE — 93306 TTE W/DOPPLER COMPLETE: CPT | Mod: 26 | Performed by: INTERNAL MEDICINE

## 2019-08-14 PROCEDURE — A9270 NON-COVERED ITEM OR SERVICE: HCPCS | Performed by: HOSPITALIST

## 2019-08-14 PROCEDURE — 770020 HCHG ROOM/CARE - TELE (206)

## 2019-08-14 PROCEDURE — 85027 COMPLETE CBC AUTOMATED: CPT

## 2019-08-14 PROCEDURE — 99232 SBSQ HOSP IP/OBS MODERATE 35: CPT | Performed by: NURSE PRACTITIONER

## 2019-08-14 PROCEDURE — 700102 HCHG RX REV CODE 250 W/ 637 OVERRIDE(OP): Performed by: HOSPITALIST

## 2019-08-14 RX ORDER — POTASSIUM CHLORIDE 20 MEQ/1
20 TABLET, EXTENDED RELEASE ORAL ONCE
Status: COMPLETED | OUTPATIENT
Start: 2019-08-14 | End: 2019-08-14

## 2019-08-14 RX ORDER — MAGNESIUM SULFATE HEPTAHYDRATE 40 MG/ML
2 INJECTION, SOLUTION INTRAVENOUS ONCE
Status: COMPLETED | OUTPATIENT
Start: 2019-08-14 | End: 2019-08-14

## 2019-08-14 RX ADMIN — TRAZODONE HYDROCHLORIDE 50 MG: 50 TABLET ORAL at 23:31

## 2019-08-14 RX ADMIN — POTASSIUM CHLORIDE 20 MEQ: 1500 TABLET, EXTENDED RELEASE ORAL at 09:31

## 2019-08-14 RX ADMIN — ASPIRIN 325 MG: 325 TABLET, FILM COATED ORAL at 04:35

## 2019-08-14 RX ADMIN — ATORVASTATIN CALCIUM 80 MG: 80 TABLET, FILM COATED ORAL at 17:29

## 2019-08-14 RX ADMIN — ENOXAPARIN SODIUM 40 MG: 100 INJECTION SUBCUTANEOUS at 04:35

## 2019-08-14 RX ADMIN — MAGNESIUM SULFATE IN WATER 2 G: 40 INJECTION, SOLUTION INTRAVENOUS at 09:31

## 2019-08-14 RX ADMIN — CARVEDILOL 6.25 MG: 6.25 TABLET, FILM COATED ORAL at 17:29

## 2019-08-14 RX ADMIN — CARVEDILOL 6.25 MG: 6.25 TABLET, FILM COATED ORAL at 04:35

## 2019-08-14 RX ADMIN — TAMSULOSIN HYDROCHLORIDE 0.4 MG: 0.4 CAPSULE ORAL at 07:46

## 2019-08-14 ASSESSMENT — ENCOUNTER SYMPTOMS
ABDOMINAL PAIN: 0
LOSS OF CONSCIOUSNESS: 0
EYE PAIN: 0
FLANK PAIN: 0
BLOOD IN STOOL: 0
SENSORY CHANGE: 0
PSYCHIATRIC NEGATIVE: 1
DIZZINESS: 0
FOCAL WEAKNESS: 0
SPEECH CHANGE: 0
SHORTNESS OF BREATH: 0
VOMITING: 0
FEVER: 0
NAUSEA: 0
CHILLS: 0
SORE THROAT: 0
MYALGIAS: 0
HEADACHES: 0
BLURRED VISION: 0
DOUBLE VISION: 0
PALPITATIONS: 0
FALLS: 0

## 2019-08-14 NOTE — ASSESSMENT & PLAN NOTE
Mild hypokalemia in the setting of hypomagnesemia.   - monitor and replete  - PO KCl and IV magnesium today

## 2019-08-14 NOTE — PROGRESS NOTES
Bedside report received from day shift RN. Assumed care. Pt is A&O x 4, Pt is in bed resting. Pt denies pain at this time. Pt was updated on plan of care. Pt has call light, personal belongings, and bedside table within reach. Bed is in the lowest position and bed alarm is on. Will continue to monitor.

## 2019-08-14 NOTE — CARE PLAN
Problem: Safety  Goal: Will remain free from injury  Outcome: PROGRESSING AS EXPECTED  Note:   Bed locked in lowest position. Bed alarm on. Treaded socks in use. Call light and belongings within reach. Patient educated to call for assistance. Pt verbalized understanding. Hourly rounding in place.       Problem: Knowledge Deficit  Goal: Knowledge of disease process/condition, treatment plan, diagnostic tests, and medications will improve  Outcome: PROGRESSING AS EXPECTED  Note:   Discussed POC and medications with patient, pt verbalized understanding.

## 2019-08-14 NOTE — PROGRESS NOTES
Hospital Medicine Daily Progress Note    Date of Service  8/14/2019    Chief Complaint  74 y.o. male admitted 8/11/2019 with acute ataxia, transferred from outside hospital to r/o stroke.    Hospital Course    feels much better today with improved ataxia, able to walk to BR with supervision; denies focal weakness/numbness/dysphagia.  Denies previous strokes.  MRI brain confirmed R inferior cerebellum infarct and tiny acute infarct L centrum semiovale.      Interval Problem Update  Doing well this AM. Discussed carotid US results. Echo performed but read is pending. Plan to continue neuro checks and if no new symptoms then can d/c tomorrow. Patient has no questions or concerns at this time. Family will pick him up tomorrow, coming from [place].     Consultants/Specialty  Neurology    Code Status  full    Disposition  Home, anticipate tomorrow    Review of Systems  Review of Systems   Constitutional: Positive for malaise/fatigue. Negative for chills and fever.   HENT: Negative for congestion and sore throat.    Eyes: Negative for blurred vision, double vision and pain.   Respiratory: Negative for shortness of breath.    Cardiovascular: Negative for chest pain, palpitations and leg swelling.   Gastrointestinal: Negative for abdominal pain, blood in stool, nausea and vomiting.   Genitourinary: Negative for dysuria, flank pain and hematuria.   Musculoskeletal: Negative for falls and myalgias.   Skin: Negative.    Neurological: Negative for dizziness, sensory change, speech change, focal weakness, loss of consciousness and headaches.   Psychiatric/Behavioral: Negative.    All other systems reviewed and are negative.       Physical Exam  Temp:  [35.9 °C (96.7 °F)-37 °C (98.6 °F)] 37 °C (98.6 °F)  Pulse:  [55-65] 58  Resp:  [16-18] 17  BP: (120-157)/(51-83) 157/83  SpO2:  [96 %-98 %] 96 %    Physical Exam   Constitutional: He is oriented to person, place, and time. He appears well-developed. No distress.   HENT:    Head: Normocephalic.   Mouth/Throat: Oropharynx is clear and moist.   Eyes: EOM are normal.   Neck: Normal range of motion. No tracheal deviation present.   Cardiovascular: Normal rate, regular rhythm and intact distal pulses.   No murmur heard.  Pulmonary/Chest: Effort normal and breath sounds normal. No stridor. No respiratory distress. He has no rales.   Abdominal: Soft. He exhibits no distension. There is no tenderness.   Musculoskeletal: He exhibits no edema or tenderness.   5/5 upper and lower extremity strength   Neurological: He is alert and oriented to person, place, and time. He displays no tremor. No cranial nerve deficit or sensory deficit. He exhibits normal muscle tone.   nonfocal exam   Skin: Skin is warm. He is not diaphoretic.   Psychiatric: His behavior is normal.   Vitals reviewed.      Fluids    Intake/Output Summary (Last 24 hours) at 8/14/2019 0814  Last data filed at 8/13/2019 1200  Gross per 24 hour   Intake --   Output 175 ml   Net -175 ml       Laboratory  Recent Labs     08/12/19  0016 08/13/19  0028 08/14/19  0155   WBC 9.1 8.7 7.3   RBC 4.34* 4.28* 4.40*   HEMOGLOBIN 11.6* 11.9* 12.0*   HEMATOCRIT 37.3* 36.6* 37.9*   MCV 85.9 85.5 86.1   MCH 26.7* 27.8 27.3   MCHC 31.1* 32.5* 31.7*   RDW 48.3 47.8 48.3   PLATELETCT 214 205 198   MPV 9.0 9.0 9.0     Recent Labs     08/12/19  0016 08/13/19  0028 08/14/19  0155   SODIUM 139 139 139   POTASSIUM 3.3* 3.6 3.5*   CHLORIDE 107 109 108   CO2 21 21 22   GLUCOSE 110* 132* 152*   BUN 13 14 11   CREATININE 0.90 0.93 0.88   CALCIUM 8.8 9.1 8.9             Recent Labs     08/12/19  0016   TRIGLYCERIDE 109   HDL 27*   LDL 40       Imaging  US-CAROTID DOPPLER BILAT   Final Result      MR-BRAIN-W/O   Final Result      1.  Moderate sized acute infarct in the right inferior cerebellum in the distribution of right posterior inferior cerebellar artery.   2.  Tiny area of acute infarct in the left centrum semiovale.   3.  Moderate chronic microvascular  ischemic disease.   4.  Moderate cerebral atrophy.      DX-CHEST-PORTABLE (1 VIEW)   Final Result         Minimal left basilar atelectasis.      CT-CTA HEAD WITH & W/O-POST PROCESS   Final Result         1. No hemodynamically significant narrowing of the major intracranial vessels.      2. Patent right vertebral artery. Somewhat diminutive appearance of the V4 portion of the left vertebral artery; however, there is still flow seen. If there is concern for acute ischemia, further evaluation with MRI is recommended.      CT-CTA NECK WITH & W/O-POST PROCESSING   Final Result         1. Patent right vertebral artery. Somewhat diminutive appearance of the V4 portion of the left vertebral artery; however, there is still flow seen.      2. No evidence of flow-limiting stenosis in the cervical carotid arteries.      OUTSIDE IMAGES-DX CHEST   Final Result      OUTSIDE IMAGES-CT HEAD   Final Result      EC-ECHOCARDIOGRAM COMPLETE W/O CONT    (Results Pending)        Assessment/Plan  * Stroke (cerebrum) (HCC)- (present on admission)  Assessment & Plan  Acute stroke, onset on 8/10 with acute ataxia/visual disturbance.  MRI showed moderate sized R inferior cerebellum stroke and tiny acute infarct L centrum ovale. Carotid US with < 50% stenosis.   - echo read pending  - Continue ASA/statin, neurochecks  - outside of permissive HTN, goal < 140/90  - PT/OT eval recommended outpatient PT, will give referral to patient  - Neuro following, appreciate recs  - per neuro recs, continue to observe until out of 5 day window for risk of edema    Hypertension- (present on admission)  Assessment & Plan  S/p permissive hypertension  - resumed coreg, lower dose as he has non sustaining and asymptomatic bradycardia    Type 2 diabetes mellitus, with long-term current use of insulin ([facility])- (present on admission)  Assessment & Plan  With hyperglycemia but A1C controlled at 6.3%. Takes metformin, januvia and lantus at home.  - sensitive sliding  scale  - DM diet and hypoglycemia protocol    Hypokalemia- (present on admission)  Assessment & Plan  Mild hypokalemia in the setting of hypomagnesemia.   - monitor and replete  - PO KCl and IV magnesium today    CAD (coronary artery disease)- (present on admission)  Assessment & Plan  S/p CABG April 2019; stable without chest pain/sob.    -Continue aspirin and statin    Elevated troponin- (present on admission)  Assessment & Plan  Denies active chest pain. Consistent with demand ischemia and trended down       VTE prophylaxis: lovenox

## 2019-08-14 NOTE — PROGRESS NOTES
2 RN skin check complete.   Devices in place none.  Skin assessed under devices none.  Confirmed pressure ulcers found on none.  New potential pressure ulcers noted on none. Wound consult placed no.  Sacrum, elbows and heels blanching  The following interventions in place ambulation

## 2019-08-14 NOTE — PROGRESS NOTES
Assumed care at 0700, bedside report received from day shift RN. Pt. Is SB on the monitor. Initial assessment completed, orders reviewed, call light within reach, bed alarm is in use, and hourly rounding in place. POC addressed with patient, no additional questions at this time.

## 2019-08-14 NOTE — PROGRESS NOTES
Chief Complaint   Patient presents with   • Possible Stroke     Stroke transfer from [facility].  Pt reports hitting his head at 9 am this morning on a steel bar off of his camper.  Not certain if had LOC.  He then developed vomiting, dizziness, and ataxia at 1500.  BIB family to [facility] and admitted to using marijuana vape pen for the first time today as well.  A CT of head was negative, but symptoms did not improve and pt was transfered to [facility] via flight transfer.       Problem List Items Addressed This Visit     None      Visit Diagnoses     Dizziness    -  Primary      Neurology Stroke Progress Note    Brief History of present illness:  74-year old male with PMHx significant for type II diabetes mellitus, hypertension, MI (remotely) who presented to [facility] as a transfer from [place], [place] for a chief complaint of nausea/vomiting/ataxia. Not a candidate for IV tPA secondary to presentation time greater than 4.5 hours from time of symptom onset. Initial NIHSS 1-2 at time of presentation. MRI Brain here has revealed Right cerebellar ischemic stroke (Right PICA territory); etiology thus far unclear, possible cardioembolic etiology.     Interval, 8/14/19:   Patient sitting up in chair at bedside; awake and alert. States that he feels well, denies headache, admits to mild dizziness primarily when ambulating. No events overnight per nursing.     No changes to HPI as was documented on 8/12/19.     Past medical history:   Past Medical History:   Diagnosis Date   • Arthritis    • Ataxia 8/11/2019   • Back pain    • Cancer (HCC)    • Diabetes (HCC)    • Fall    • Hypertension    • Myocardial infarct (HCC)    • Stroke (cerebrum) ([facility]) 8/12/2019       Past surgical history:   Past Surgical History:   Procedure Laterality Date   • OTHER CARDIAC SURGERY         Family history:   No family history on file.    Social history:   Social History     Socioeconomic History   • Marital status: Single      Spouse name: Not on file   • Number of children: Not on file   • Years of education: Not on file   • Highest education level: Not on file   Occupational History   • Not on file   Social Needs   • Financial resource strain: Not on file   • Food insecurity:     Worry: Not on file     Inability: Not on file   • Transportation needs:     Medical: Not on file     Non-medical: Not on file   Tobacco Use   • Smoking status: Never Smoker   • Smokeless tobacco: Never Used   Substance and Sexual Activity   • Alcohol use: Not Currently     Alcohol/week: 0.0 oz     Frequency: Never     Drinks per session: 1 or 2     Binge frequency: Never   • Drug use: Never   • Sexual activity: Not on file   Lifestyle   • Physical activity:     Days per week: Not on file     Minutes per session: Not on file   • Stress: Not on file   Relationships   • Social connections:     Talks on phone: Not on file     Gets together: Not on file     Attends Anabaptist service: Not on file     Active member of club or organization: Not on file     Attends meetings of clubs or organizations: Not on file     Relationship status: Not on file   • Intimate partner violence:     Fear of current or ex partner: Not on file     Emotionally abused: Not on file     Physically abused: Not on file     Forced sexual activity: Not on file   Other Topics Concern   • Not on file   Social History Narrative   • Not on file       Current medications:   Current Facility-Administered Medications   Medication Dose   • [START ON 8/15/2019] aspirin EC (ECOTRIN) tablet 81 mg  81 mg   • traZODone (DESYREL) tablet 50 mg  50 mg   • atorvastatin (LIPITOR) tablet 80 mg  80 mg   • tamsulosin (FLOMAX) capsule 0.4 mg  0.4 mg   • carvedilol (COREG) tablet 6.25 mg  6.25 mg   • acetaminophen (TYLENOL) tablet 650 mg  650 mg   • senna-docusate (PERICOLACE or SENOKOT S) 8.6-50 MG per tablet 2 Tab  2 Tab    And   • polyethylene glycol/lytes (MIRALAX) PACKET 1 Packet  1 Packet    And   • magnesium  hydroxide (MILK OF MAGNESIA) suspension 30 mL  30 mL    And   • bisacodyl (DULCOLAX) suppository 10 mg  10 mg   • Respiratory Care per Protocol     • NS infusion     • enoxaparin (LOVENOX) inj 40 mg  40 mg   • ondansetron (ZOFRAN) syringe/vial injection 4 mg  4 mg   • ondansetron (ZOFRAN ODT) dispertab 4 mg  4 mg   • meclizine (ANTIVERT) tablet 25 mg  25 mg   • insulin regular (HUMULIN R) injection 1-6 Units  1-6 Units    And   • glucose 4 g chewable tablet 16 g  16 g    And   • DEXTROSE 10% BOLUS 250 mL  250 mL       Medication Allergy:  Not on File    Review of systems:   Constitutional: denies fever, night sweats, weight loss.   Eyes: denies acute vision change, eye pain or secretion.   Ears, Nose, Mouth, Throat: denies nasal secretion, nasal bleeding, difficulty swallowing, hearing loss, tinnitus, vertigo, ear pain, acute dental problems, oral ulcers or lesions.   Endocrine: denies recent weight changes, heat or cold intolerance, polyuria, polydypsia, polyphagia,abnormal hair growth.  Cardiovascular: denies new onset of chest pain, palpitations, syncope, or dyspnea of exertion.  Pulmonary: denies shortness of breath, new onset of cough, hemoptysis, wheezing, chest pain or flu-like symptoms.   GI: denies nausea, vomiting, diarrhea, GI bleeding, change in appetite, abdominal pain, and change in bowel habits.  : denies dysuria, urinary incontinence, hematuria.  Heme/oncology: denies history of easy bruising or bleeding. No history of cancer, DVTor PE.  Allergy/immunology: denies hives/urticaria, or itching.   Dermatologic: denies new rash, or new skin lesions.  Musculoskeletal:denies joint swelling or pain, muscle pain, neck and back pain. Neurologic:As noted above.    Psychiatric: denies symptoms of depression, anxiety, hallucinations, mood swings or changes, suicidal or homicidal thoughts.     Physical examination:   Vitals:    08/14/19 0003 08/14/19 0415 08/14/19 0911 08/14/19 1256   BP: 139/65 157/83 141/71  152/75   Pulse: 60 (!) 58 60 (!) 56   Resp: 16 17 14 14   Temp: 36.3 °C (97.4 °F) 37 °C (98.6 °F) 36 °C (96.8 °F) 36.3 °C (97.3 °F)   TempSrc: Temporal Temporal Temporal Temporal   SpO2: 97% 96% 98% 96%   Weight:       Height:         General: Patient in no acute distress, pleasant and cooperative.  HEENT: Normocephalic, no signs of acute trauma.   Neck: supple, no meningeal signs or carotid bruits. There is normal range of motion. No tenderness on exam.   Chest: clear to auscultation. No cough.   CV: RRR, no murmurs.   Skin: no signs of acute rashes or trauma.   Musculoskeletal: joints exhibit full range of motion, without any pain to palpation. There are no signs of joint or muscle swelling. There is no tenderness to deep palpation of muscles.   Psychiatric: No hallucinatory behavior. Denies symptoms of depression or suicidal ideation. Mood and affect appear normal on exam.     NEUROLOGICAL EXAM:   Mental status, orientation: Awake, alert and fully oriented.   Speech and language: speech is clear and fluent. The patient is able to name, repeat and comprehend.   Memory: There is intact recollection of recent and remote events.   Cranial nerve exam: Pupils are 3-4 mm bilaterally and equally reactive to light and accommodation. Visual fields are intact by confrontation. There is no nystagmus on primary or secondary gaze. Intact full EOM in all directions of gaze. Face appears symmetric. Sensation in the face is intact to light touch. Uvula is midline. Palate elevates symmetrically. Tongue is midline and without any signs of tongue biting or fasciculations. Sternocleidomastoid muscles exhibit is normal strength bilaterally. Shoulder shrug is intact bilaterally.   Motor exam: Strength is 5/5 in all extremities. Tone is normal. No abnormal movements were seen on exam.   Sensory exam reveals normal sense of light touch and pinprick in all extremities.   Deep tendon reflexes:  2+ throughout. Plantar responses are flexor.  There is no clonus.   Coordination: Mild LUE dysmetria. Otherwise normal; Normal rapidly alternating movements.   Gait: Not assessed at this time as patient is a fall risk.       NIH Stroke Scale    1a Level of Consciousness   1b Orientation Questions   1c Response to Commands   2 Gaze   3 Visual Fields   4 Facial Movement   5 Motor Function (arm)   a Left   b Right   6 Motor Function (leg)   a Left   b Right   7 Limb Ataxia 1  8 Sensory   9 Language   10 Articulation   11 Extinction/Inattention     Score: 1    ANCILLARY DATA REVIEWED:     Lab Data Review:  Recent Results (from the past 24 hour(s))   ACCU-CHEK GLUCOSE    Collection Time: 08/13/19  6:30 PM   Result Value Ref Range    Glucose - Accu-Ck 131 (H) 65 - 99 mg/dL   ACCU-CHEK GLUCOSE    Collection Time: 08/14/19 12:04 AM   Result Value Ref Range    Glucose - Accu-Ck 134 (H) 65 - 99 mg/dL   CBC WITHOUT DIFFERENTIAL    Collection Time: 08/14/19  1:55 AM   Result Value Ref Range    WBC 7.3 4.8 - 10.8 K/uL    RBC 4.40 (L) 4.70 - 6.10 M/uL    Hemoglobin 12.0 (L) 14.0 - 18.0 g/dL    Hematocrit 37.9 (L) 42.0 - 52.0 %    MCV 86.1 81.4 - 97.8 fL    MCH 27.3 27.0 - 33.0 pg    MCHC 31.7 (L) 33.7 - 35.3 g/dL    RDW 48.3 35.9 - 50.0 fL    Platelet Count 198 164 - 446 K/uL    MPV 9.0 9.0 - 12.9 fL   Basic Metabolic Panel    Collection Time: 08/14/19  1:55 AM   Result Value Ref Range    Sodium 139 135 - 145 mmol/L    Potassium 3.5 (L) 3.6 - 5.5 mmol/L    Chloride 108 96 - 112 mmol/L    Co2 22 20 - 33 mmol/L    Glucose 152 (H) 65 - 99 mg/dL    Bun 11 8 - 22 mg/dL    Creatinine 0.88 0.50 - 1.40 mg/dL    Calcium 8.9 8.5 - 10.5 mg/dL    Anion Gap 9.0 0.0 - 11.9   MAGNESIUM    Collection Time: 08/14/19  1:55 AM   Result Value Ref Range    Magnesium 1.7 1.5 - 2.5 mg/dL   ESTIMATED GFR    Collection Time: 08/14/19  1:55 AM   Result Value Ref Range    GFR If African American >60 >60 mL/min/1.73 m 2    GFR If Non African American >60 >60 mL/min/1.73 m 2   ACCU-CHEK GLUCOSE     Collection Time: 08/14/19  7:47 AM   Result Value Ref Range    Glucose - Accu-Ck 133 (H) 65 - 99 mg/dL   EC-ECHOCARDIOGRAM COMPLETE W/O CONT    Collection Time: 08/14/19 10:13 AM   Result Value Ref Range    Eject.Frac. MOD BP 59.7     Eject.Frac. MOD 4C 62.75     Eject.Frac. MOD 2C 55.81     Left Ventrical Ejection Fraction 60    ACCU-CHEK GLUCOSE    Collection Time: 08/14/19 12:53 PM   Result Value Ref Range    Glucose - Accu-Ck 135 (H) 65 - 99 mg/dL       Labs reviewed by me.       Imaging reviewed by me:     EC-ECHOCARDIOGRAM COMPLETE W/O CONT   Final Result      US-CAROTID DOPPLER BILAT   Final Result      MR-BRAIN-W/O   Final Result      1.  Moderate sized acute infarct in the right inferior cerebellum in the distribution of right posterior inferior cerebellar artery.   2.  Tiny area of acute infarct in the left centrum semiovale.   3.  Moderate chronic microvascular ischemic disease.   4.  Moderate cerebral atrophy.      DX-CHEST-PORTABLE (1 VIEW)   Final Result         Minimal left basilar atelectasis.      CT-CTA HEAD WITH & W/O-POST PROCESS   Final Result         1. No hemodynamically significant narrowing of the major intracranial vessels.      2. Patent right vertebral artery. Somewhat diminutive appearance of the V4 portion of the left vertebral artery; however, there is still flow seen. If there is concern for acute ischemia, further evaluation with MRI is recommended.      CT-CTA NECK WITH & W/O-POST PROCESSING   Final Result         1. Patent right vertebral artery. Somewhat diminutive appearance of the V4 portion of the left vertebral artery; however, there is still flow seen.      2. No evidence of flow-limiting stenosis in the cervical carotid arteries.      OUTSIDE IMAGES-DX CHEST   Final Result      OUTSIDE IMAGES-CT HEAD   Final Result          Presumed mechanism by TOAST:  __Large Artery Atherosclerosis  __Small Vessel (Lacunar)  __Cardioembolic  __Other (Sickle Cell, Vasculitis,  Hypercoagulable)  _X_Unknown    Vs cardioembolic.     ASSESSMENT AND PLAN:  74-year old male with PMHx significant for type II diabetes mellitus, hypertension, MI (remotely) who presented to [facility] as a transfer from [place], [place] for a chief complaint of nausea/vomiting/ataxia. Not a candidate for IV tPA secondary to presentation time greater than 4.5 hours from time of symptom onset. Initial NIHSS 1-2 at time of presentation. MRI Brain here has revealed small/moderately sized Right cerebellar ischemic stroke (Right PICA territory); no associated mass effect. Etiology thus far remains unclear, possible cardioembolic etiology vs in setting of vascular risk factors for stroke (hypertension, DM II). Given this, recommend further outpatient (PCP or cardiology) work up including holter/Loop monitoring to further rule out/rule in arrhythmia/Afib.    Recommendations/Plan:     -q4h and PRN neuro assessment VS per nursing/unit protocol.   -Telemetry; currently SR. Continue to screen for Afib/arrhythmia. Note TTE with EF 60%,  Moderate LVH, Mildly dilated left atrium. As was noted above, will recommend outpatient Holter/Loop monitoring to formally rule out arrhythmia/Afib. This may be done at home (Patient lives in [facility]) via his PCP or cardiologist. This was discussed with patient at length and he voices his understanding.   -Continue ASA 81 mg PO q day and Atorvastatin 40 mg PO q HS. Note LDL is 40.   -Recommend aggressive BG management per primary team; avoid hypo/hyperglycemia, avoid IVF with Dextrose. Note A1c is 6.3.   -Continue PT/OT as needed.   -Counseled patient at length regarding life style and risk factor modification for secondary stroke prevention.   -All other medical management per primary team.  -DVT Prophylaxis: SCDs.     The plan of care above has been discussed with Dr. Rosenberg.    Court Bower MSN, BSN, AGNP-C  [facility]

## 2019-08-15 ENCOUNTER — PATIENT OUTREACH (OUTPATIENT)
Dept: HEALTH INFORMATION MANAGEMENT | Facility: OTHER | Age: 74
End: 2019-08-15

## 2019-08-15 VITALS
HEIGHT: 69 IN | WEIGHT: 222 LBS | BODY MASS INDEX: 32.88 KG/M2 | HEART RATE: 57 BPM | OXYGEN SATURATION: 97 % | SYSTOLIC BLOOD PRESSURE: 142 MMHG | DIASTOLIC BLOOD PRESSURE: 67 MMHG | TEMPERATURE: 96.9 F | RESPIRATION RATE: 18 BRPM

## 2019-08-15 PROBLEM — E87.6 HYPOKALEMIA: Status: RESOLVED | Noted: 2019-08-14 | Resolved: 2019-08-15

## 2019-08-15 PROBLEM — R79.89 ELEVATED TROPONIN: Status: RESOLVED | Noted: 2019-08-11 | Resolved: 2019-08-15

## 2019-08-15 LAB
ANION GAP SERPL CALC-SCNC: 10 MMOL/L (ref 0–11.9)
BUN SERPL-MCNC: 14 MG/DL (ref 8–22)
CALCIUM SERPL-MCNC: 8.9 MG/DL (ref 8.5–10.5)
CHLORIDE SERPL-SCNC: 109 MMOL/L (ref 96–112)
CO2 SERPL-SCNC: 19 MMOL/L (ref 20–33)
CREAT SERPL-MCNC: 0.9 MG/DL (ref 0.5–1.4)
GLUCOSE BLD-MCNC: 111 MG/DL (ref 65–99)
GLUCOSE BLD-MCNC: 133 MG/DL (ref 65–99)
GLUCOSE BLD-MCNC: 133 MG/DL (ref 65–99)
GLUCOSE SERPL-MCNC: 127 MG/DL (ref 65–99)
MAGNESIUM SERPL-MCNC: 1.9 MG/DL (ref 1.5–2.5)
POTASSIUM SERPL-SCNC: 3.4 MMOL/L (ref 3.6–5.5)
SODIUM SERPL-SCNC: 138 MMOL/L (ref 135–145)

## 2019-08-15 PROCEDURE — 82962 GLUCOSE BLOOD TEST: CPT

## 2019-08-15 PROCEDURE — 700102 HCHG RX REV CODE 250 W/ 637 OVERRIDE(OP): Performed by: INTERNAL MEDICINE

## 2019-08-15 PROCEDURE — A9270 NON-COVERED ITEM OR SERVICE: HCPCS | Performed by: INTERNAL MEDICINE

## 2019-08-15 PROCEDURE — 700102 HCHG RX REV CODE 250 W/ 637 OVERRIDE(OP): Performed by: HOSPITALIST

## 2019-08-15 PROCEDURE — 83735 ASSAY OF MAGNESIUM: CPT

## 2019-08-15 PROCEDURE — 700105 HCHG RX REV CODE 258: Performed by: INTERNAL MEDICINE

## 2019-08-15 PROCEDURE — 99239 HOSP IP/OBS DSCHRG MGMT >30: CPT | Performed by: HOSPITALIST

## 2019-08-15 PROCEDURE — 80048 BASIC METABOLIC PNL TOTAL CA: CPT

## 2019-08-15 PROCEDURE — 700111 HCHG RX REV CODE 636 W/ 250 OVERRIDE (IP): Performed by: INTERNAL MEDICINE

## 2019-08-15 PROCEDURE — 36415 COLL VENOUS BLD VENIPUNCTURE: CPT

## 2019-08-15 PROCEDURE — A9270 NON-COVERED ITEM OR SERVICE: HCPCS | Performed by: HOSPITALIST

## 2019-08-15 RX ORDER — POTASSIUM CHLORIDE 20 MEQ/1
40 TABLET, EXTENDED RELEASE ORAL ONCE
Status: COMPLETED | OUTPATIENT
Start: 2019-08-15 | End: 2019-08-15

## 2019-08-15 RX ORDER — NITROGLYCERIN 0.4 MG/1
0.4 TABLET SUBLINGUAL PRN
COMMUNITY

## 2019-08-15 RX ORDER — ATORVASTATIN CALCIUM 80 MG/1
80 TABLET, FILM COATED ORAL EVERY EVENING
Qty: 30 TAB | Refills: 0 | Status: SHIPPED | OUTPATIENT
Start: 2019-08-15

## 2019-08-15 RX ORDER — CHLORAL HYDRATE 500 MG
2 CAPSULE ORAL 2 TIMES DAILY WITH MEALS
COMMUNITY

## 2019-08-15 RX ORDER — CARVEDILOL 6.25 MG/1
6.25 TABLET ORAL 2 TIMES DAILY WITH MEALS
Qty: 60 TAB | Refills: 0 | Status: SHIPPED | OUTPATIENT
Start: 2019-08-15

## 2019-08-15 RX ORDER — IRBESARTAN 150 MG/1
150 TABLET ORAL DAILY
COMMUNITY

## 2019-08-15 RX ADMIN — SODIUM CHLORIDE: 9 INJECTION, SOLUTION INTRAVENOUS at 03:27

## 2019-08-15 RX ADMIN — ASPIRIN 81 MG: 81 TABLET, COATED ORAL at 04:58

## 2019-08-15 RX ADMIN — ACETAMINOPHEN 650 MG: 325 TABLET, FILM COATED ORAL at 04:23

## 2019-08-15 RX ADMIN — ENOXAPARIN SODIUM 40 MG: 100 INJECTION SUBCUTANEOUS at 04:58

## 2019-08-15 RX ADMIN — CARVEDILOL 6.25 MG: 6.25 TABLET, FILM COATED ORAL at 09:13

## 2019-08-15 RX ADMIN — TAMSULOSIN HYDROCHLORIDE 0.4 MG: 0.4 CAPSULE ORAL at 09:13

## 2019-08-15 RX ADMIN — POTASSIUM CHLORIDE 40 MEQ: 20 TABLET, EXTENDED RELEASE ORAL at 09:13

## 2019-08-15 NOTE — PROGRESS NOTES
Assumed care at 0700. Bedside report received from Olivia. Patient's chart and MAR reviewed. Pt complains of left big toe pain at this time. Pt is A & O 4. Patient was updated on plan of care for the day. Questions answered and concerns addressed.  Pt denies any additional needs at this time. White board updated. Call light, phone and personal belongings within reach.

## 2019-08-15 NOTE — DISCHARGE INSTRUCTIONS
Discharge Instructions per Rachna Holden M.D.    Please follow up with your PCP, may need referral for cardiology for a holter vs loop recorder in the outpatient setting. You are also being provided a referral for outpatient Physical Therapy     ACTIVITY: as tolerated.     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    Return to ER if you develop new or worsening symptoms.     Discharge Instructions    Discharged to home by car with relative. Discharged via wheelchair, hospital escort: Yes.  Special equipment needed: Not Applicable    Be sure to schedule a follow-up appointment with your primary care doctor or any specialists as instructed.     Discharge Plan:   Diet Plan: Discussed  Activity Level: Discussed  Confirmed Follow up Appointment: Patient to Call and Schedule Appointment  Confirmed Symptoms Management: Discussed  Medication Reconciliation Updated: Yes  Influenza Vaccine Indication: Not indicated: Previously immunized this influenza season and > 8 years of age    I understand that a diet low in cholesterol, fat, and sodium is recommended for good health. Unless I have been given specific instructions below for another diet, I accept this instruction as my diet prescription.   Other diet:   Heart-Healthy Eating Plan  Introduction  Heart-healthy meal planning includes:  · Limiting unhealthy fats.  · Increasing healthy fats.  · Making other small dietary changes.  You may need to talk with your doctor or a diet specialist (dietitian) to create an eating plan that is right for you.  What types of fat should I choose?  · Choose healthy fats. These include olive oil and canola oil, flaxseeds, walnuts, almonds, and seeds.  · Eat more omega-3 fats. These include salmon, mackerel, sardines, tuna, flaxseed oil, and ground flaxseeds. Try to eat fish at least twice each week.  · Limit saturated fats.  ¨ Saturated  "fats are often found in animal products, such as meats, butter, and cream.  ¨ Plant sources of saturated fats include palm oil, palm kernel oil, and coconut oil.  · Avoid foods with partially hydrogenated oils in them. These include stick margarine, some tub margarines, cookies, crackers, and other baked goods. These contain trans fats.  What general guidelines do I need to follow?  · Check food labels carefully. Identify foods with trans fats or high amounts of saturated fat.  · Fill one half of your plate with vegetables and green salads. Eat 4-5 servings of vegetables per day. A serving of vegetables is:  ¨ 1 cup of raw leafy vegetables.  ¨ ½ cup of raw or cooked cut-up vegetables.  ¨ ½ cup of vegetable juice.  · Fill one fourth of your plate with whole grains. Look for the word \"whole\" as the first word in the ingredient list.  · Fill one fourth of your plate with lean protein foods.  · Eat 4-5 servings of fruit per day. A serving of fruit is:  ¨ One medium whole fruit.  ¨ ¼ cup of dried fruit.  ¨ ½ cup of fresh, frozen, or canned fruit.  ¨ ½ cup of 100% fruit juice.  · Eat more foods that contain soluble fiber. These include apples, broccoli, carrots, beans, peas, and barley. Try to get 20-30 g of fiber per day.  · Eat more home-cooked food. Eat less restaurant, buffet, and fast food.  · Limit or avoid alcohol.  · Limit foods high in starch and sugar.  · Avoid fried foods.  · Avoid frying your food. Try baking, boiling, grilling, or broiling it instead. You can also reduce fat by:  ¨ Removing the skin from poultry.  ¨ Removing all visible fats from meats.  ¨ Skimming the fat off of stews, soups, and gravies before serving them.  ¨ Steaming vegetables in water or broth.  · Lose weight if you are overweight.  · Eat 4-5 servings of nuts, legumes, and seeds per week:  ¨ One serving of dried beans or legumes equals ½ cup after being cooked.  ¨ One serving of nuts equals 1½ ounces.  ¨ One serving of seeds equals ½ " ounce or one tablespoon.  · You may need to keep track of how much salt or sodium you eat. This is especially true if you have high blood pressure. Talk with your doctor or dietitian to get more information.  What foods can I eat?  Grains   Breads, including Turkmen, white, janice, wheat, raisin, rye, oatmeal, and Italian. Tortillas that are neither fried nor made with lard or trans fat. Low-fat rolls, including hotdog and hamburger buns and English muffins. Biscuits. Muffins. Waffles. Pancakes. Light popcorn. Whole-grain cereals. Flatbread. Luci toast. Pretzels. Breadsticks. Rusks. Low-fat snacks. Low-fat crackers, including oyster, saltine, matzo, chantel, animal, and rye. Rice and pasta, including brown rice and pastas that are made with whole wheat.  Vegetables   All vegetables.  Fruits   All fruits, but limit coconut.  Meats and Other Protein Sources   Lean, well-trimmed beef, veal, pork, and lamb. Chicken and turkey without skin. All fish and shellfish. Wild duck, rabbit, pheasant, and venison. Egg whites or low-cholesterol egg substitutes. Dried beans, peas, lentils, and tofu. Seeds and most nuts.  Dairy   Low-fat or nonfat cheeses, including ricotta, string, and mozzarella. Skim or 1% milk that is liquid, powdered, or evaporated. Buttermilk that is made with low-fat milk. Nonfat or low-fat yogurt.  Beverages   Mineral water. Diet carbonated beverages.  Sweets and Desserts   Sherbets and fruit ices. Honey, jam, marmalade, jelly, and syrups. Meringues and gelatins. Pure sugar candy, such as hard candy, jelly beans, gumdrops, mints, marshmallows, and small amounts of dark chocolate. Matthew food cake.  Eat all sweets and desserts in moderation.  Fats and Oils   Nonhydrogenated (trans-free) margarines. Vegetable oils, including soybean, sesame, sunflower, olive, peanut, safflower, corn, canola, and cottonseed. Salad dressings or mayonnaise made with a vegetable oil. Limit added fats and oils that you use for cooking,  baking, salads, and as spreads.  Other   Cocoa powder. Coffee and tea. All seasonings and condiments.  The items listed above may not be a complete list of recommended foods or beverages. Contact your dietitian for more options.   What foods are not recommended?  Grains   Breads that are made with saturated or trans fats, oils, or whole milk. Croissants. Butter rolls. Cheese breads. Sweet rolls. Donuts. Buttered popcorn. Chow mein noodles. High-fat crackers, such as cheese or butter crackers.  Meats and Other Protein Sources   Fatty meats, such as hotdogs, short ribs, sausage, spareribs, morales, rib eye roast or steak, and mutton. High-fat deli meats, such as salami and bologna. Caviar. Domestic duck and goose. Organ meats, such as kidney, liver, sweetbreads, and heart.  Dairy   Cream, sour cream, cream cheese, and creamed cottage cheese. Whole-milk cheeses, including blue (carlos), Warren Jose L, Brie, Edward, American, Havarti, Swiss, cheddar, Camembert, and [place]. Whole or 2% milk that is liquid, evaporated, or condensed. Whole buttermilk. Cream sauce or high-fat cheese sauce. Yogurt that is made from whole milk.  Beverages   Regular sodas and juice drinks with added sugar.  Sweets and Desserts   Frosting. Pudding. Cookies. Cakes other than pawel food cake. Candy that has milk chocolate or white chocolate, hydrogenated fat, butter, coconut, or unknown ingredients. Buttered syrups. Full-fat ice cream or ice cream drinks.  Fats and Oils   Gravy that has suet, meat fat, or shortening. Cocoa butter, hydrogenated oils, palm oil, coconut oil, palm kernel oil. These can often be found in baked products, candy, fried foods, nondairy creamers, and whipped toppings. Solid fats and shortenings, including morales fat, salt pork, lard, and butter. Nondairy cream substitutes, such as coffee creamers and sour cream substitutes. Salad dressings that are made of unknown oils, cheese, or sour cream.  The items listed above may not be  a complete list of foods and beverages to avoid. Contact your dietitian for more information.   This information is not intended to replace advice given to you by your health care provider. Make sure you discuss any questions you have with your health care provider.  Document Released: 06/18/2013 Document Revised: 05/25/2017 Document Reviewed: 06/11/2015  © 2017 Elsevier      Special Instructions:     Stroke/CVA/TIA/Hemorrhagic Ischemia Discharge Instructions  You have had a stroke. Your risk factors have been identified as follows:  Age - Over 55  Gender - Men are at a higher risk than women  High blood pressure  Diabetes  Heart disease  It is important that you reduce your risk factors to avoid another stroke in the future. Here are some general guidelines to follow:  · Eat healthy - avoid food high in fat.  · Get regular exercise.  · Maintain a healthy weight.  · Avoid smoking.  · Avoid alcohol and illegal drug use.  · Take your medications as directed.  For more information regarding risk factors, refer to pages 17-19 in your Stroke Patient Education Guide. Stroke Education Guide was given to patient.    Warning signs of a stroke include (which can also be found on page 3 of your Stroke Patient Education Guide):  · Sudden numbness of weakness of the face, arm or leg (especially on one side of the body).  · Sudden confusion, trouble speaking or understanding.  · Sudden trouble seeing in one or both eyes.  · Sudden trouble walking, dizziness, loss of balance or coordination.  · Sudden severe headache with no known cause.  It is very important to get treatment quickly when a stroke occurs. If you experience any of the above warning signs, call 911 immediately.     Some patients who have had a stroke will be going home on a blood thinner medication called Warfarin (Coumadin).  This medication requires very close monitoring and follow up.  This follow up can be provided by either your Primary Care Physician or by  [facility] Outpatient Anticoagulation Service.  The Outpatient Anticoagulation Service is located at the [facility] at [facility] ([facility]).  If you do not know when your follow up appointment is scheduled, call [phone] to verify your appointment time.      · Is patient discharged on Warfarin / Coumadin?   No     Atorvastatin tablets  What is this medicine?  ATORVASTATIN (a TORE va sta tin) is known as a HMG-CoA reductase inhibitor or 'statin'. It lowers the level of cholesterol and triglycerides in the blood. This drug may also reduce the risk of heart attack, stroke, or other health problems in patients with risk factors for heart disease. Diet and lifestyle changes are often used with this drug.  This medicine may be used for other purposes; ask your health care provider or pharmacist if you have questions.  COMMON BRAND NAME(S): Lipitor  What should I tell my health care provider before I take this medicine?  They need to know if you have any of these conditions:  -frequently drink alcoholic beverages  -history of stroke, TIA  -kidney disease  -liver disease  -muscle aches or weakness  -other medical condition  -an unusual or allergic reaction to atorvastatin, other medicines, foods, dyes, or preservatives  -pregnant or trying to get pregnant  -breast-feeding  How should I use this medicine?  Take this medicine by mouth with a glass of water. Follow the directions on the prescription label. You can take this medicine with or without food. Take your doses at regular intervals. Do not take your medicine more often than directed.  Talk to your pediatrician regarding the use of this medicine in children. While this drug may be prescribed for children as young as 10 years old for selected conditions, precautions do apply.  Overdosage: If you think you have taken too much of this medicine contact a poison control center or emergency room at once.  NOTE: This  medicine is only for you. Do not share this medicine with others.  What if I miss a dose?  If you miss a dose, take it as soon as you can. If it is almost time for your next dose, take only that dose. Do not take double or extra doses.  What may interact with this medicine?  Do not take this medicine with any of the following medications:  -red yeast rice  -telaprevir  -telithromycin  -voriconazole  This medicine may also interact with the following medications:  -alcohol  -antiviral medicines for HIV or AIDS  -boceprevir  -certain antibiotics like clarithromycin, erythromycin, troleandomycin  -certain medicines for cholesterol like fenofibrate or gemfibrozil  -cimetidine  -clarithromycin  -colchicine  -cyclosporine  -digoxin  -female hormones, like estrogens or progestins and birth control pills  -grapefruit juice  -medicines for fungal infections like fluconazole, itraconazole, ketoconazole  -niacin  -rifampin  -spironolactone  This list may not describe all possible interactions. Give your health care provider a list of all the medicines, herbs, non-prescription drugs, or dietary supplements you use. Also tell them if you smoke, drink alcohol, or use illegal drugs. Some items may interact with your medicine.  What should I watch for while using this medicine?  Visit your doctor or health care professional for regular check-ups. You may need regular tests to make sure your liver is working properly.  Tell your doctor or health care professional right away if you get any unexplained muscle pain, tenderness, or weakness, especially if you also have a fever and tiredness. Your doctor or health care professional may tell you to stop taking this medicine if you develop muscle problems. If your muscle problems do not go away after stopping this medicine, contact your health care professional.  This drug is only part of a total heart-health program. Your doctor or a dietician can suggest a low-cholesterol and low-fat  diet to help. Avoid alcohol and smoking, and keep a proper exercise schedule.  Do not use this drug if you are pregnant or breast-feeding. Serious side effects to an unborn child or to an infant are possible. Talk to your doctor or pharmacist for more information.  This medicine may affect blood sugar levels. If you have diabetes, check with your doctor or health care professional before you change your diet or the dose of your diabetic medicine.  If you are going to have surgery tell your health care professional that you are taking this drug.  What side effects may I notice from receiving this medicine?  Side effects that you should report to your doctor or health care professional as soon as possible:  -allergic reactions like skin rash, itching or hives, swelling of the face, lips, or tongue  -dark urine  -fever  -joint pain  -muscle cramps, pain  -redness, blistering, peeling or loosening of the skin, including inside the mouth  -trouble passing urine or change in the amount of urine  -unusually weak or tired  -yellowing of eyes or skin  Side effects that usually do not require medical attention (report to your doctor or health care professional if they continue or are bothersome):  -constipation  -heartburn  -stomach gas, pain, upset  This list may not describe all possible side effects. Call your doctor for medical advice about side effects. You may report side effects to FDA at 7-777-FDA-9989.  Where should I keep my medicine?  Keep out of the reach of children.  Store at room temperature between 20 to 25 degrees C (68 to 77 degrees F). Throw away any unused medicine after the expiration date.  NOTE: This sheet is a summary. It may not cover all possible information. If you have questions about this medicine, talk to your doctor, pharmacist, or health care provider.  © 2018 Elsevier/Gold Standard (2012-11-06 09:18:24)      Depression / Suicide Risk    As you are discharged from this [facility],  it is important to learn how to keep safe from harming yourself.    Recognize the warning signs:  · Abrupt changes in personality, positive or negative- including increase in energy   · Giving away possessions  · Change in eating patterns- significant weight changes-  positive or negative  · Change in sleeping patterns- unable to sleep or sleeping all the time   · Unwillingness or inability to communicate  · Depression  · Unusual sadness, discouragement and loneliness  · Talk of wanting to die  · Neglect of personal appearance   · Rebelliousness- reckless behavior  · Withdrawal from people/activities they love  · Confusion- inability to concentrate     If you or a loved one observes any of these behaviors or has concerns about self-harm, here's what you can do:  · Talk about it- your feelings and reasons for harming yourself  · Remove any means that you might use to hurt yourself (examples: pills, rope, extension cords, firearm)  · Get professional help from the community (Mental Health, Substance Abuse, psychological counseling)  · Do not be alone:Call your Safe Contact- someone whom you trust who will be there for you.  · Call your local CRISIS HOTLINE 916-0299 or 671-787-8598  · Call your local Children's Mobile Crisis Response Team [place] (892) 538-3678 or www.[employer]  · Call the toll free National Suicide Prevention Hotlines   · National Suicide Prevention Lifeline 981-307-JNKY (7206)  · National Hope Line Network 800-SUICIDE (402-4899)

## 2019-08-15 NOTE — CARE PLAN
Pt educated on the importance fall prevention methods, such as treaded sock and the bed alarm. Pt stated they will use the call light prior to any attempts of ambulation. Ambulatory ability assessed, treaded socks in place, bed locked and in low position, frequent trips to bathroom offered, and call light and phone within reach.     Pt educated regarding plan of care and medications. All questions answered.

## 2019-08-15 NOTE — PROGRESS NOTES
Monitor Summary  Rhythm: first degree heart block  Rate: 58-71   Ectopy: PVC/couplet/1.5sec pause   .24  / .08  / .48

## 2019-08-15 NOTE — PROGRESS NOTES
Bedside report received. Patient A&O x4. Pt. Complains of no pain.  Pt requested trazodone to help sleep this evening. Will medicate per MAR. POC discussed with patient. Patient verbalized understanding. Call light and belongings within reach. Bed locked and in lowest position, alarm and fall precautions in place.

## 2019-08-15 NOTE — PROGRESS NOTES
Pt dc'd at 1530. IV and monitor removed; monitor room notified. Pt left unit via ambulating with relatives. Personal belongings with pt when leaving unit. Pt given discharge instructions prior to leaving unit including prescription and when to visit with physician; verbalizes understanding. Copy of discharge instructions with pt and in the chart.

## 2024-03-29 NOTE — DISCHARGE SUMMARY
Discharge Summary    CHIEF COMPLAINT ON ADMISSION  Chief Complaint   Patient presents with   • Possible Stroke     Stroke transfer from [facility].  Pt reports hitting his head at 9 am this morning on a steel bar off of his camper.  Not certain if had LOC.  He then developed vomiting, dizziness, and ataxia at 1500.  BIB family to [facility] and admitted to using marijuana vape pen for the first time today as well.  A CT of head was negative, but symptoms did not improve and pt was transfered to [facility] via flight transfer.       Reason for Admission  Poss stroke     Admission Date  8/11/2019    CODE STATUS  Full Code    HPI & HOSPITAL COURSE  This is a 74 y.o. male with CAPD s/p stent and CABG, valve replacement (aortic per echo), HLD, HTN and IDDM here as a transfer from [facility] on 8/11 with concerns of a posterior circulation CVA. Patient states that he was vaping [place], had a fall and hit the back of his head, denied any LOC at that time. Later in the afternoon he developed sudden nausea, vomiting, vertigo and inability to keep his balance. CT head was negative but given his constellation of symptoms he was transferred for higher level of care.    NIHSS at the time of presentation to [facility] was 1-2. MRI brain was positive for an acute right cerebellar ischemic stroke in the right PICA territory. There was no associated mass effect. Etiology remained unclear but there was concern for a cardioembolic source. He was monitored on telemetry, neuro checks q4 hours and evaluated by PT/OT. Therapies recommended HH vs outpatient physical therapy. Since the patient has good social support and is not home bound, outpatient PT is more appropriate. Patient felt improved and balance improved, other symptoms resolved. He was sinus rhythm on the monitors. He stayed for additional days because neurology recommended observation for increased risk of sudden edema and possible acute decline given the territory  of the CVA. He remained stable and was eager for discharge.  Carotid US showed < 50% stenosis bilaterally and echo showed normal EF of 60%, moderate LVH, mild MR and probable bioprosthetic aortic valve.    Therefore, he is discharged in good and stable condition to home with close outpatient follow-up and outpatient PT referral.    The patient met 2-midnight criteria for an inpatient stay at the time of discharge.    Discharge Date  8/15/2019    FOLLOW UP ITEMS POST DISCHARGE  Please follow up with your PCP. Neurology recommended evaluation and possible loop recorder or holter monitor.    Dose of beta blocker was reduced given bradycardia.    DISCHARGE DIAGNOSES  Principal Problem:    Stroke (cerebrum) ([facility]) POA: Yes  Active Problems:    Hypertension POA: Yes    Type 2 diabetes mellitus, with long-term current use of insulin ([facility]) POA: Yes    CAD (coronary artery disease) POA: Yes  Resolved Problems:    Ataxia POA: Yes    Vertigo POA: Yes    Elevated troponin POA: Yes    Hypokalemia POA: Yes      FOLLOW UP  Hattie Horvath  [address] [facility]  [place] [address] [phone]    In 1 week  For Hospital Stay Follow up      MEDICATIONS ON DISCHARGE     Medication List      START taking these medications      Instructions   atorvastatin 80 MG tablet  Commonly known as:  LIPITOR   Take 1 Tab by mouth every evening.  Dose:  80 mg        CHANGE how you take these medications      Instructions   carvedilol 6.25 MG Tabs  What changed:    · medication strength  · how much to take  Commonly known as:  COREG   Take 1 Tab by mouth 2 times a day, with meals.  Dose:  6.25 mg        CONTINUE taking these medications      Instructions   aspirin 81 MG Chew chewable tablet  Commonly known as:  ASA   Take 81 mg by mouth every day.  Dose:  81 mg     B COMPLEX 1 PO   Take 1 Capsule by mouth every day.  Dose:  1 Capsule     Chromium Picolinate 200 MCG Caps   Take 200 mcg by mouth every day.  Dose:  200 mcg     coenzyme Q-10  30 MG capsule   Take 30 mg by mouth every day.  Dose:  30 mg     irbesartan 150 MG Tabs  Commonly known as:  AVAPRO   Take 150 mg by mouth every day.  Dose:  150 mg     metFORMIN 500 MG Tabs  Commonly known as:  GLUCOPHAGE   Take 1,000 mg by mouth 2 times a day, with meals. Indications: Type 2 Diabetes  Dose:  1,000 mg     nitroglycerin 0.4 MG Subl  Commonly known as:  NITROSTAT   Place 0.4 mg under tongue as needed for Chest Pain.  Dose:  0.4 mg     Omega-3 1000 MG Caps   Take 2 Capsule by mouth 2 times a day, with meals.  Dose:  2 Capsule     tamsulosin 0.4 MG capsule  Commonly known as:  FLOMAX   Take 0.4 mg by mouth ONE-HALF HOUR AFTER BREAKFAST.  Dose:  0.4 mg     venlafaxine 37.5 MG Tabs  Commonly known as:  EFFEXOR   Take 37.5 mg by mouth 2 Times a Day.  Dose:  37.5 mg        STOP taking these medications    insulin glargine 100 UNIT/ML Soln  Commonly known as:  LANTUS     rosuvastatin 20 MG Tabs  Commonly known as:  CRESTOR     SITagliptin 100 MG Tabs  Commonly known as:  JANUVIA            Allergies  Not on File    DIET  Orders Placed This Encounter   Procedures   • Diet Order Cardiac, Diabetic     Standing Status:   Standing     Number of Occurrences:   1     Order Specific Question:   Diet:     Answer:   Cardiac [6]     Order Specific Question:   Diet:     Answer:   Diabetic [3]       ACTIVITY  As tolerated.  Weight bearing as tolerated    CONSULTATIONS  Neurology    PROCEDURES  EC-ECHOCARDIOGRAM COMPLETE W/O CONT   Final Result      US-CAROTID DOPPLER BILAT   Final Result      MR-BRAIN-W/O   Final Result      1.  Moderate sized acute infarct in the right inferior cerebellum in the distribution of right posterior inferior cerebellar artery.   2.  Tiny area of acute infarct in the left centrum semiovale.   3.  Moderate chronic microvascular ischemic disease.   4.  Moderate cerebral atrophy.      DX-CHEST-PORTABLE (1 VIEW)   Final Result         Minimal left basilar atelectasis.      CT-CTA HEAD WITH &  W/O-POST PROCESS   Final Result         1. No hemodynamically significant narrowing of the major intracranial vessels.      2. Patent right vertebral artery. Somewhat diminutive appearance of the V4 portion of the left vertebral artery; however, there is still flow seen. If there is concern for acute ischemia, further evaluation with MRI is recommended.      CT-CTA NECK WITH & W/O-POST PROCESSING   Final Result         1. Patent right vertebral artery. Somewhat diminutive appearance of the V4 portion of the left vertebral artery; however, there is still flow seen.      2. No evidence of flow-limiting stenosis in the cervical carotid arteries.      OUTSIDE IMAGES-DX CHEST   Final Result      OUTSIDE IMAGES-CT HEAD   Final Result            LABORATORY  Lab Results   Component Value Date    SODIUM 138 08/15/2019    POTASSIUM 3.4 (L) 08/15/2019    CHLORIDE 109 08/15/2019    CO2 19 (L) 08/15/2019    GLUCOSE 127 (H) 08/15/2019    BUN 14 08/15/2019    CREATININE 0.90 08/15/2019        Lab Results   Component Value Date    WBC 7.3 08/14/2019    HEMOGLOBIN 12.0 (L) 08/14/2019    HEMATOCRIT 37.9 (L) 08/14/2019    PLATELETCT 198 08/14/2019        Total time of the discharge process exceeds 48 minutes.   none